# Patient Record
Sex: FEMALE | Race: WHITE | NOT HISPANIC OR LATINO | Employment: UNEMPLOYED | URBAN - METROPOLITAN AREA
[De-identification: names, ages, dates, MRNs, and addresses within clinical notes are randomized per-mention and may not be internally consistent; named-entity substitution may affect disease eponyms.]

---

## 2023-01-24 PROBLEM — J32.9 BACTERIAL SINUSITIS: Status: ACTIVE | Noted: 2023-01-24

## 2023-01-24 PROBLEM — R40.0 DAYTIME SOMNOLENCE: Status: ACTIVE | Noted: 2023-01-24

## 2023-01-24 PROBLEM — M48.061 LUMBAR SPINAL STENOSIS: Status: ACTIVE | Noted: 2023-01-24

## 2023-01-24 PROBLEM — R19.5 POSITIVE COLORECTAL CANCER SCREENING USING COLOGUARD TEST: Status: ACTIVE | Noted: 2023-01-24

## 2023-01-24 PROBLEM — M54.16 LUMBAR RADICULOPATHY: Status: ACTIVE | Noted: 2023-01-24

## 2023-01-24 PROBLEM — R35.1 NOCTURIA: Status: ACTIVE | Noted: 2023-01-24

## 2023-01-24 PROBLEM — R09.89 BRUIT: Status: ACTIVE | Noted: 2023-01-24

## 2023-01-24 PROBLEM — E78.5 HYPERLIPIDEMIA: Status: ACTIVE | Noted: 2023-01-24

## 2023-01-24 PROBLEM — M25.551 BILATERAL HIP PAIN: Status: ACTIVE | Noted: 2023-01-24

## 2023-01-24 PROBLEM — J98.8 RESPIRATORY TRACT INFECTION: Status: ACTIVE | Noted: 2023-01-24

## 2023-01-24 PROBLEM — J01.90 SINUSITIS, ACUTE: Status: ACTIVE | Noted: 2023-01-24

## 2023-01-24 PROBLEM — J44.9 COPD (CHRONIC OBSTRUCTIVE PULMONARY DISEASE) (MULTI): Status: ACTIVE | Noted: 2023-01-24

## 2023-01-24 PROBLEM — M54.30 SCIATICA: Status: ACTIVE | Noted: 2023-01-24

## 2023-01-24 PROBLEM — M17.12 PRIMARY OSTEOARTHRITIS OF LEFT KNEE: Status: ACTIVE | Noted: 2023-01-24

## 2023-01-24 PROBLEM — G57.00 PIRIFORMIS SYNDROME: Status: ACTIVE | Noted: 2023-01-24

## 2023-01-24 PROBLEM — M25.551 HIP PAIN, RIGHT: Status: ACTIVE | Noted: 2023-01-24

## 2023-01-24 PROBLEM — R63.2 OVEREATING: Status: ACTIVE | Noted: 2023-01-24

## 2023-01-24 PROBLEM — M85.80 OSTEOPENIA: Status: ACTIVE | Noted: 2023-01-24

## 2023-01-24 PROBLEM — B96.89 BACTERIAL SINUSITIS: Status: ACTIVE | Noted: 2023-01-24

## 2023-01-24 PROBLEM — M81.0 OSTEOPOROSIS: Status: ACTIVE | Noted: 2023-01-24

## 2023-01-24 PROBLEM — M25.552 BILATERAL HIP PAIN: Status: ACTIVE | Noted: 2023-01-24

## 2023-01-24 PROBLEM — M54.42 CHRONIC LEFT-SIDED LOW BACK PAIN WITH LEFT-SIDED SCIATICA: Status: ACTIVE | Noted: 2023-01-24

## 2023-01-24 PROBLEM — R79.89 LOW VITAMIN B12 LEVEL: Status: ACTIVE | Noted: 2023-01-24

## 2023-01-24 PROBLEM — G89.29 CHRONIC LEFT-SIDED LOW BACK PAIN WITH LEFT-SIDED SCIATICA: Status: ACTIVE | Noted: 2023-01-24

## 2023-01-24 PROBLEM — H04.129 DRY EYE SYNDROME: Status: ACTIVE | Noted: 2023-01-24

## 2023-01-24 PROBLEM — E11.9 DIABETES (MULTI): Status: ACTIVE | Noted: 2023-01-24

## 2023-01-24 PROBLEM — Z79.2 PROPHYLACTIC ANTIBIOTIC: Status: ACTIVE | Noted: 2023-01-24

## 2023-01-24 PROBLEM — K21.9 GERD (GASTROESOPHAGEAL REFLUX DISEASE): Status: ACTIVE | Noted: 2023-01-24

## 2023-01-24 PROBLEM — M54.31 NEURALGIA OF RIGHT SCIATIC NERVE: Status: ACTIVE | Noted: 2023-01-24

## 2023-01-24 PROBLEM — L21.9 SEBORRHEIC DERMATITIS: Status: ACTIVE | Noted: 2023-01-24

## 2023-01-24 PROBLEM — R05.3 PERSISTENT COUGH: Status: ACTIVE | Noted: 2023-01-24

## 2023-01-24 PROBLEM — B02.29 POST HERPETIC NEURALGIA: Status: ACTIVE | Noted: 2023-01-24

## 2023-01-24 PROBLEM — G62.9 NEUROPATHY: Status: ACTIVE | Noted: 2023-01-24

## 2023-01-24 PROBLEM — L85.3 DRY SKIN DERMATITIS: Status: ACTIVE | Noted: 2023-01-24

## 2023-01-24 PROBLEM — M79.18 BUTTOCK PAIN: Status: ACTIVE | Noted: 2023-01-24

## 2023-01-24 PROBLEM — F41.9 ANXIETY: Status: ACTIVE | Noted: 2023-01-24

## 2023-01-24 PROBLEM — I10 HYPERTENSION: Status: ACTIVE | Noted: 2023-01-24

## 2023-01-24 RX ORDER — LORATADINE 10 MG/1
1 TABLET ORAL DAILY PRN
COMMUNITY
Start: 2013-12-26

## 2023-01-24 RX ORDER — ALBUTEROL SULFATE 90 UG/1
2 AEROSOL, METERED RESPIRATORY (INHALATION) EVERY 4 HOURS PRN
COMMUNITY
Start: 2022-05-14

## 2023-01-24 RX ORDER — GABAPENTIN 100 MG/1
100 CAPSULE ORAL
COMMUNITY
End: 2023-10-31 | Stop reason: ALTCHOICE

## 2023-01-24 RX ORDER — PANTOPRAZOLE SODIUM 40 MG/1
40 TABLET, DELAYED RELEASE ORAL DAILY PRN
COMMUNITY
Start: 2021-07-26 | End: 2023-10-31 | Stop reason: ALTCHOICE

## 2023-01-24 RX ORDER — CLINDAMYCIN HYDROCHLORIDE 150 MG/1
4 CAPSULE ORAL
COMMUNITY
Start: 2020-08-11 | End: 2024-04-30 | Stop reason: WASHOUT

## 2023-01-24 RX ORDER — ALPRAZOLAM 0.5 MG/1
1 TABLET ORAL DAILY PRN
COMMUNITY
Start: 2021-07-26 | End: 2023-06-08 | Stop reason: ALTCHOICE

## 2023-01-24 RX ORDER — EZETIMIBE 10 MG/1
1 TABLET ORAL
COMMUNITY
Start: 2015-10-30

## 2023-01-24 RX ORDER — FLUTICASONE PROPIONATE 50 MCG
1 SPRAY, SUSPENSION (ML) NASAL 2 TIMES DAILY PRN
COMMUNITY
Start: 2016-10-24

## 2023-01-24 RX ORDER — INSULIN PUMP SYRINGE, 3 ML
EACH MISCELLANEOUS
COMMUNITY

## 2023-01-24 RX ORDER — AMLODIPINE BESYLATE 5 MG/1
1 TABLET ORAL DAILY
COMMUNITY
Start: 2013-02-25 | End: 2023-04-19 | Stop reason: SDUPTHER

## 2023-01-24 RX ORDER — ATENOLOL 50 MG/1
1 TABLET ORAL
COMMUNITY
Start: 2014-10-27 | End: 2023-06-29 | Stop reason: SDUPTHER

## 2023-01-24 RX ORDER — ASPIRIN 81 MG/1
1 TABLET ORAL
COMMUNITY
Start: 2013-12-26

## 2023-01-24 RX ORDER — ACETAMINOPHEN 500 MG
1 TABLET ORAL
COMMUNITY

## 2023-01-24 RX ORDER — BLOOD SUGAR DIAGNOSTIC
STRIP MISCELLANEOUS
COMMUNITY
Start: 2019-02-22

## 2023-01-24 RX ORDER — BUDESONIDE AND FORMOTEROL FUMARATE DIHYDRATE 160; 4.5 UG/1; UG/1
2 AEROSOL RESPIRATORY (INHALATION) 2 TIMES DAILY PRN
COMMUNITY
Start: 2019-01-24

## 2023-01-24 RX ORDER — METFORMIN HYDROCHLORIDE 500 MG/1
1 TABLET ORAL 2 TIMES DAILY
COMMUNITY
Start: 2021-04-27 | End: 2023-04-11 | Stop reason: SDUPTHER

## 2023-01-24 RX ORDER — LANCETS
EACH MISCELLANEOUS
COMMUNITY

## 2023-01-24 RX ORDER — TIZANIDINE 2 MG/1
1 TABLET ORAL 2 TIMES DAILY PRN
COMMUNITY
Start: 2022-09-07 | End: 2023-03-08 | Stop reason: SDUPTHER

## 2023-03-08 ENCOUNTER — LAB (OUTPATIENT)
Dept: LAB | Facility: LAB | Age: 83
End: 2023-03-08
Payer: MEDICARE

## 2023-03-08 ENCOUNTER — OFFICE VISIT (OUTPATIENT)
Dept: PRIMARY CARE | Facility: CLINIC | Age: 83
End: 2023-03-08
Payer: MEDICARE

## 2023-03-08 VITALS
HEIGHT: 66 IN | WEIGHT: 204 LBS | SYSTOLIC BLOOD PRESSURE: 118 MMHG | BODY MASS INDEX: 32.78 KG/M2 | DIASTOLIC BLOOD PRESSURE: 84 MMHG | OXYGEN SATURATION: 98 % | HEART RATE: 74 BPM

## 2023-03-08 DIAGNOSIS — R60.0 LOCALIZED EDEMA: ICD-10-CM

## 2023-03-08 DIAGNOSIS — M54.16 LUMBAR RADICULOPATHY: ICD-10-CM

## 2023-03-08 DIAGNOSIS — G57.00 PIRIFORMIS SYNDROME, UNSPECIFIED LATERALITY: ICD-10-CM

## 2023-03-08 DIAGNOSIS — M62.830 MUSCLE SPASM OF BACK: ICD-10-CM

## 2023-03-08 DIAGNOSIS — M79.89 LEG SWELLING: ICD-10-CM

## 2023-03-08 DIAGNOSIS — M79.89 LEG SWELLING: Primary | ICD-10-CM

## 2023-03-08 LAB
ALANINE AMINOTRANSFERASE (SGPT) (U/L) IN SER/PLAS: 13 U/L (ref 7–45)
ALBUMIN (G/DL) IN SER/PLAS: 4.5 G/DL (ref 3.4–5)
ALKALINE PHOSPHATASE (U/L) IN SER/PLAS: 60 U/L (ref 33–136)
ANION GAP IN SER/PLAS: 12 MMOL/L (ref 10–20)
ASPARTATE AMINOTRANSFERASE (SGOT) (U/L) IN SER/PLAS: 13 U/L (ref 9–39)
BILIRUBIN TOTAL (MG/DL) IN SER/PLAS: 0.4 MG/DL (ref 0–1.2)
CALCIUM (MG/DL) IN SER/PLAS: 10 MG/DL (ref 8.6–10.6)
CARBON DIOXIDE, TOTAL (MMOL/L) IN SER/PLAS: 30 MMOL/L (ref 21–32)
CHLORIDE (MMOL/L) IN SER/PLAS: 107 MMOL/L (ref 98–107)
CREATININE (MG/DL) IN SER/PLAS: 0.84 MG/DL (ref 0.5–1.05)
GFR FEMALE: 68 ML/MIN/1.73M2
GLUCOSE (MG/DL) IN SER/PLAS: 102 MG/DL (ref 74–99)
POTASSIUM (MMOL/L) IN SER/PLAS: 4.1 MMOL/L (ref 3.5–5.3)
PROTEIN TOTAL: 6.3 G/DL (ref 6.4–8.2)
SODIUM (MMOL/L) IN SER/PLAS: 145 MMOL/L (ref 136–145)
UREA NITROGEN (MG/DL) IN SER/PLAS: 15 MG/DL (ref 6–23)

## 2023-03-08 PROCEDURE — 3079F DIAST BP 80-89 MM HG: CPT | Performed by: INTERNAL MEDICINE

## 2023-03-08 PROCEDURE — 1036F TOBACCO NON-USER: CPT | Performed by: INTERNAL MEDICINE

## 2023-03-08 PROCEDURE — 1159F MED LIST DOCD IN RCRD: CPT | Performed by: INTERNAL MEDICINE

## 2023-03-08 PROCEDURE — 36415 COLL VENOUS BLD VENIPUNCTURE: CPT

## 2023-03-08 PROCEDURE — 99214 OFFICE O/P EST MOD 30 MIN: CPT | Performed by: INTERNAL MEDICINE

## 2023-03-08 PROCEDURE — 80053 COMPREHEN METABOLIC PANEL: CPT

## 2023-03-08 PROCEDURE — 3074F SYST BP LT 130 MM HG: CPT | Performed by: INTERNAL MEDICINE

## 2023-03-08 RX ORDER — TIZANIDINE HYDROCHLORIDE 2 MG/1
2 CAPSULE, GELATIN COATED ORAL 3 TIMES DAILY
Qty: 90 CAPSULE | Refills: 2 | Status: SHIPPED | OUTPATIENT
Start: 2023-03-08 | End: 2023-06-08 | Stop reason: SDUPTHER

## 2023-03-08 NOTE — PROGRESS NOTES
Subjective   Patient ID: Mai Gonzalez is a 82 y.o. female who presents for Follow-up.  HPI  ,Subjective   Patient ID: Mai Gonzalez is a 82 y.o. female who presents for Follow-up.    Patient has chronic back pain lumbar lower radiates to buttocks bilateral for many months 3 to 4 months persistent grade 6 out of 10 states it is made worse after going to pain management for a epidural injection January 2023 she is very upset about this visit she states they did an injection she started convulsing in her body after they did not come to explain what happened to her the pain management doctor she called them 3 times over the last 2 weeks no answer she is very upset about the experience with the pain management doctor the injection did not help he is getting a bit better now that she is going to chair yoga using other conservative measures  Since the injection she has noticed progressive leg swelling bilateral mostly around the ankles right greater than left  Denies any saddle paresthesias bowel urinary incontinence focal leg weakness saddle paresthesias paralysis              Health Maintenance:      Colonoscopy: Age 82 no longer indicated      Mammogram: Age 82 no longer indicated      Pelvic/Pap:      Low dose chest CT:      Aorta duplex:      Optho:      Podiatry:        Vaccines:      Prevnar 20:      Prevnar 13:      Pneumovax 23:      Tdap:      Shingrix:      COVID:      Influenza:        ROS:      General: denies fever/chills/weight loss      Head: denies HA/trauma/masses/dizziness      Eyes: Dry eyes since the back injection denies vision change/loss of vision/blurry vision/diplopia/eye pain      Ears: denies hearing loss/tinnitus/otalgia/otorrhea      Nose: denies nasal drainage/anosmia      Throat: denies dysphagia/odynophagia      Lymphatics: denies lymph node swelling      Cardiac: denies CP/palpitations/orthopnea/PND      Pulmonary: denies dyspnea/cough/wheezing      GI: denies abd  "pain/n/v/diarrhea/melena/hematochezia/hematemesis      : denies dysuria/hematuria/change frequency      Genital: denies genital discharge/lesions      Skin: Dry skin lower extremities denies rashes/lesions/masses      MSK: Chronic low back and buttock pain spasms she states he got better actually in the past with Zanaflex got worse after the epidural attempt injection ; leg swelling since having the injection for the last month and a half denies weakness/swelling/edema/gait imbalance/pain      Neuro: denies paresthesias/seizures/dysarthria      Psych: denies depression/anxiety/suicidal or homicidal ideations            Objective   /84   Pulse 74   Ht 1.676 m (5' 6\")   Wt 92.5 kg (204 lb)   SpO2 98%   BMI 32.93 kg/m²      Physical Exam:     General: AO3, NAD     Head: atraumatic/NC     Eyes: EOMI/PERRLA. Negative APD     Ears: TM pearly gray, EAC clear. No lesions or erythema     Nose: symmetric nares, no discharge     Throat: trachea midline, uvula midline pink mucosa. No thyromegaly     Lymphatics: no cervical/supraclavicular/ant or posterior cervical adenopathy/axillary/inguinal adenopathy     Breast: not examined     Chest: no deformity or tenderness to palpation     Pulm: CTA b/l, no wheeze/rhonchi/rales. nonlabored     Cardiac: RRR +s1s2, no m/r/g.      GI: soft, NT/ND. Normoactive Bsx4. No rebound/guarding.     Rectal: no examined     MSK: 5/5 strength UE LE. No edema/clubbing/cyanosis     Skin: no rashes/lesions     Vascular: 2+ palp DP PT radials b/l. Negative carotid bruit     Neuro: CNII-XII intact. No focal deficits. Reflexes 2/4 brachioradialis bicep tricep patellar achilles. Finger to nose intact.     Psych: appropriate mood/affect                    No results found for: BMPR1A, CBCDIF      Assessment/Plan        Routine screening lab work due May 2023 CBC CMP A1c lipid T4 TSH A1c    Thank you for making appointment today Mai    Please follow-up in 3 months    Jet Valdez DO      " "        Health Maintenance:      Colonoscopy:      Mammogram:      Pelvic/Pap:      Low dose chest CT:      Aorta duplex:      Optho:      Podiatry:        Vaccines:      Prevnar 20:      Prevnar 13:      Pneumovax 23:      Tdap:      Shingrix:      COVID:      Influenza:        ROS:      General: denies fever/chills/weight loss      Head: denies HA/trauma/masses/dizziness      Eyes: denies vision change/loss of vision/blurry vision/diplopia/eye pain      Ears: denies hearing loss/tinnitus/otalgia/otorrhea      Nose: denies nasal drainage/anosmia      Throat: denies dysphagia/odynophagia      Lymphatics: denies lymph node swelling      Cardiac: denies CP/palpitations/orthopnea/PND      Pulmonary: denies dyspnea/cough/wheezing      GI: denies abd pain/n/v/diarrhea/melena/hematochezia/hematemesis      : denies dysuria/hematuria/change frequency      Genital: denies genital discharge/lesions      Skin: denies rashes/lesions/masses      MSK: denies weakness/swelling/edema/gait imbalance/pain      Neuro: denies paresthesias/seizures/dysarthria      Psych: denies depression/anxiety/suicidal or homicidal ideations            Objective   /84   Pulse 74   Ht 1.676 m (5' 6\")   Wt 92.5 kg (204 lb)   SpO2 98%   BMI 32.93 kg/m²      Physical Exam:     General: AO3, NAD     Head: atraumatic/NC     Eyes: EOMI/PERRLA. Negative APD     Ears: TM pearly gray, EAC clear. No lesions or erythema     Nose: symmetric nares, no discharge     Throat: trachea midline, uvula midline pink mucosa. No thyromegaly     Lymphatics: no cervical/supraclavicular/ant or posterior cervical adenopathy/axillary/inguinal adenopathy     Breast: not examined     Chest: no deformity or tenderness to palpation     Pulm: CTA b/l, no wheeze/rhonchi/rales. nonlabored     Cardiac: RRR +s1s2, no m/r/g.      GI: soft, NT/ND. Normoactive Bsx4. No rebound/guarding.     Rectal: no examined     MSK: 5/5 strength UE LE. No edema/clubbing/cyanosis     Skin: no " rashes/lesions     Vascular: 2+ palp DP PT radials b/l. Negative carotid bruit     Neuro: CNII-XII intact. No focal deficits. Reflexes 2/4 brachioradialis bicep tricep patellar achilles. Finger to nose intact.     Psych: appropriate mood/affect                    No results found for: BMPR1A, CBCDIF      Assessment/Plan            Jet Valdez, DO

## 2023-03-13 ENCOUNTER — TELEPHONE (OUTPATIENT)
Dept: PRIMARY CARE | Facility: CLINIC | Age: 83
End: 2023-03-13
Payer: MEDICARE

## 2023-03-22 DIAGNOSIS — R19.09 RIGHT GROIN MASS: ICD-10-CM

## 2023-03-22 DIAGNOSIS — I50.30 DIASTOLIC CONGESTIVE HEART FAILURE, UNSPECIFIED HF CHRONICITY (MULTI): Primary | ICD-10-CM

## 2023-03-22 RX ORDER — FUROSEMIDE 20 MG/1
20 TABLET ORAL DAILY PRN
Qty: 30 TABLET | Refills: 2 | Status: SHIPPED | OUTPATIENT
Start: 2023-03-22 | End: 2023-06-08 | Stop reason: ALTCHOICE

## 2023-03-22 NOTE — TELEPHONE ENCOUNTER
Result Communication  Called patient with results no answer left voicemail advised echo shows good pumping fraction of the heart but she has diastolic CHF likely causing some of the leg swelling follow-up with cardiology as ordered and take Lasix daily as needed as ordered; ultrasound legs no sign of blood clot but there is a small lesion in the right groin area which could be a lymph node versus other should follow-up with general surgery for review no acute issues.  Advised if any further questions or would like an in office result review please schedule follow-up in the next 2 to 4 weeks thank you      10:38 AM  Called patient with results no answer left voicemail advised echo shows good pumping fraction of the heart but she has diastolic CHF likely causing some of the leg swelling follow-up with cardiology as ordered and take Lasix daily as needed as ordered; ultrasound legs no sign of blood clot but there is a small lesion in the right groin area which could be a lymph node versus other should follow-up with general surgery for review no acute issues.  Advised if any further questions or would like an in office result review please schedule follow-up in the next 2 to 4 weeks thank you

## 2023-03-29 ENCOUNTER — TELEPHONE (OUTPATIENT)
Dept: PRIMARY CARE | Facility: CLINIC | Age: 83
End: 2023-03-29
Payer: MEDICARE

## 2023-03-29 NOTE — TELEPHONE ENCOUNTER
Result Communication        5:18 PM    Called patient no answer left voicemail advised ultrasound of the lower extremity is negative for blood clots no issues.  Advised if any further questions or would like an in office result review please schedule follow-up the next 2 to 4 weeks thank you

## 2023-04-11 DIAGNOSIS — E11.9 TYPE 2 DIABETES MELLITUS WITHOUT COMPLICATION, WITHOUT LONG-TERM CURRENT USE OF INSULIN (MULTI): Primary | ICD-10-CM

## 2023-04-11 RX ORDER — METFORMIN HYDROCHLORIDE 500 MG/1
500 TABLET ORAL 2 TIMES DAILY
Qty: 60 TABLET | Refills: 1 | Status: SHIPPED | OUTPATIENT
Start: 2023-04-11 | End: 2023-07-18 | Stop reason: SDUPTHER

## 2023-04-19 DIAGNOSIS — I10 HYPERTENSION, UNSPECIFIED TYPE: Primary | ICD-10-CM

## 2023-04-19 RX ORDER — AMLODIPINE BESYLATE 5 MG/1
5 TABLET ORAL DAILY
Qty: 90 TABLET | Refills: 1 | Status: SHIPPED | OUTPATIENT
Start: 2023-04-19 | End: 2023-10-31 | Stop reason: SDUPTHER

## 2023-06-08 ENCOUNTER — OFFICE VISIT (OUTPATIENT)
Dept: PRIMARY CARE | Facility: CLINIC | Age: 83
End: 2023-06-08
Payer: MEDICARE

## 2023-06-08 VITALS
HEART RATE: 66 BPM | DIASTOLIC BLOOD PRESSURE: 74 MMHG | OXYGEN SATURATION: 97 % | WEIGHT: 206 LBS | SYSTOLIC BLOOD PRESSURE: 128 MMHG | BODY MASS INDEX: 33.11 KG/M2 | HEIGHT: 66 IN

## 2023-06-08 DIAGNOSIS — Z91.89 AT RISK FOR IMPAIRED HEALTH MAINTENANCE: ICD-10-CM

## 2023-06-08 DIAGNOSIS — M54.50 LUMBAR BACK PAIN: ICD-10-CM

## 2023-06-08 DIAGNOSIS — Z00.00 HEALTHCARE MAINTENANCE: Primary | ICD-10-CM

## 2023-06-08 DIAGNOSIS — M62.830 MUSCLE SPASM OF BACK: ICD-10-CM

## 2023-06-08 PROCEDURE — 1170F FXNL STATUS ASSESSED: CPT | Performed by: INTERNAL MEDICINE

## 2023-06-08 PROCEDURE — 1157F ADVNC CARE PLAN IN RCRD: CPT | Performed by: INTERNAL MEDICINE

## 2023-06-08 PROCEDURE — 1036F TOBACCO NON-USER: CPT | Performed by: INTERNAL MEDICINE

## 2023-06-08 PROCEDURE — 1159F MED LIST DOCD IN RCRD: CPT | Performed by: INTERNAL MEDICINE

## 2023-06-08 PROCEDURE — G0439 PPPS, SUBSEQ VISIT: HCPCS | Performed by: INTERNAL MEDICINE

## 2023-06-08 PROCEDURE — 99214 OFFICE O/P EST MOD 30 MIN: CPT | Performed by: INTERNAL MEDICINE

## 2023-06-08 PROCEDURE — 3074F SYST BP LT 130 MM HG: CPT | Performed by: INTERNAL MEDICINE

## 2023-06-08 PROCEDURE — 3078F DIAST BP <80 MM HG: CPT | Performed by: INTERNAL MEDICINE

## 2023-06-08 RX ORDER — TIZANIDINE HYDROCHLORIDE 2 MG/1
2 CAPSULE, GELATIN COATED ORAL 3 TIMES DAILY
Qty: 90 CAPSULE | Refills: 2 | Status: SHIPPED | OUTPATIENT
Start: 2023-06-08 | End: 2023-10-31 | Stop reason: ALTCHOICE

## 2023-06-08 ASSESSMENT — PATIENT HEALTH QUESTIONNAIRE - PHQ9
1. LITTLE INTEREST OR PLEASURE IN DOING THINGS: NOT AT ALL
SUM OF ALL RESPONSES TO PHQ9 QUESTIONS 1 AND 2: 0
2. FEELING DOWN, DEPRESSED OR HOPELESS: NOT AT ALL

## 2023-06-08 ASSESSMENT — ACTIVITIES OF DAILY LIVING (ADL)
GROCERY_SHOPPING: INDEPENDENT
TAKING_MEDICATION: INDEPENDENT
DOING_HOUSEWORK: INDEPENDENT
DRESSING: INDEPENDENT
MANAGING_FINANCES: INDEPENDENT
BATHING: INDEPENDENT

## 2023-06-08 ASSESSMENT — ENCOUNTER SYMPTOMS
LOSS OF SENSATION IN FEET: 0
OCCASIONAL FEELINGS OF UNSTEADINESS: 0
DEPRESSION: 0

## 2023-06-08 NOTE — PROGRESS NOTES
Chief Complaint: Medicare Wellness Exam/Comprehensive Problem Focused Follow Up    HPI:  Patient presents with some back pain lower lumbar seems to radiate across her low back piriformis she states months grade 5 worse when she sleeps at night she believes tizanidine helped in the past she is trialing Tylenol and Aleve may be helps a little bit  Denies saddle paresthesias bowel urinary incontinence focal weakness    Active Problem List      Comprehensive Medical/Surgical/Social/Family History  Past Medical History:   Diagnosis Date    Adverse effect of unspecified systemic antibiotic, initial encounter 02/28/2017    Antibiotics causing adverse effect in therapeutic use, initial encounter    Aftercare following joint replacement surgery 05/16/2018    Aftercare following right hip joint replacement surgery    Body mass index (BMI) 33.0-33.9, adult 02/23/2022    BMI 33.0-33.9,adult    Body mass index (BMI) 33.0-33.9, adult     BMI 33.0-33.9,adult    Candidiasis of skin and nail 10/25/2019    Candidal intertrigo    Encounter for other screening for malignant neoplasm of breast     Screening for breast cancer    Fracture of unspecified carpal bone, right wrist, initial encounter for closed fracture 08/14/2015    Wrist fracture, right    Malignant neoplasm of upper-outer quadrant of left female breast (CMS/HCC) 11/08/2016    Breast cancer of upper-outer quadrant of left female breast    Other conditions influencing health status 12/02/2016    History of cough    Personal history of diseases of the skin and subcutaneous tissue 02/28/2017    History of urticaria    Personal history of other diseases of the respiratory system 02/12/2017    History of acute sinusitis    Personal history of other diseases of the respiratory system 12/02/2016    History of acute bronchitis    Personal history of other specified conditions 08/13/2018    History of fatigue    Personal history of other specified conditions 07/16/2019    History of  dysuria     Past Surgical History:   Procedure Laterality Date    ADENOIDECTOMY  02/28/2017    Adenoidectomy    HIP SURGERY  08/14/2015    Hip Surgery Right    KNEE SURGERY  01/24/2019    Knee Surgery Left    OTHER SURGICAL HISTORY  08/14/2015    History Of Prior Surgery    OTHER SURGICAL HISTORY  08/14/2015    Dental Surgery    OTHER SURGICAL HISTORY  08/14/2015    Hip Surgery Left    OTHER SURGICAL HISTORY  08/14/2015    Treatment Of The Right Leg    OTHER SURGICAL HISTORY  01/24/2019    Cosmetic surgery    OTHER SURGICAL HISTORY  08/14/2015    Left Breast Partial Mastectomy    SENTINEL LYMPH NODE BIOPSY  08/14/2015    [facility]anks Lymph Node Biopsy    TONSILLECTOMY  02/28/2017    Tonsillectomy     Social History     Social History Narrative    Not on file         Allergies and Medications  Ace inhibitors, Amoxicillin, Atorvastatin, Colesevelam, Red yeast rice, Rosuvastatin, and Venlafaxine  Current Outpatient Medications on File Prior to Visit   Medication Sig Dispense Refill    albuterol 90 mcg/actuation inhaler Inhale 2 puffs every 4 hours if needed.      amLODIPine (Norvasc) 5 mg tablet Take 1 tablet (5 mg) by mouth once daily. For Hypertension 90 tablet 1    ascorbic acid (VITAMIN C ORAL) Take 1,000 mg by mouth once every day.      aspirin 81 mg EC tablet Take 1 tablet (81 mg) by mouth once every day.      atenolol (Tenormin) 50 mg tablet Take 1 tablet (50 mg) by mouth once every day.      blood glucose control, normal solution Use  as directed. Testing every day, Non-Insulin      blood sugar diagnostic (Contour Next Test Strips) strip Testing 1 x daily Non Insulin Dependent Diabetes 2      budesonide-formoteroL (Symbicort) 160-4.5 mcg/actuation inhaler Inhale 2 puffs 2 times a day as needed. Rinse mouth after use      calcium carbonate (CORAL CALCIUM ORAL) Take 2 tablets by mouth once every day.      cholecalciferol (Vitamin D-3) 50 mcg (2,000 unit) capsule Take 1 capsule (50 mcg) by mouth once every day.       clindamycin (Cleocin) 150 mg capsule Take 4 capsules (600 mg) by mouth. Take one hour before the procedure   For: Antibiotic prophylaxis for dental procedure indicated due to prior joint replacement.      ezetimibe (Zetia) 10 mg tablet Take 1 tablet (10 mg) by mouth once every day.      fluticasone (Flonase) 50 mcg/actuation nasal spray Administer 1 spray into each nostril 2 times a day as needed. For: Health Maintenance      lancets misc Testing once daily      loratadine (Claritin) 10 mg tablet Take 1 tablet (10 mg) by mouth once daily as needed.      omega-3/dha/epa/fish oil (OMEGA-3 ORAL) 300 mg. Take 2 capsules (600 mg) once every day.      pantoprazole (ProtoNix) 40 mg EC tablet Take 1 tablet (40 mg) by mouth once daily as needed. For: GERD 9 gastroesophageal reflux disease)      [DISCONTINUED] tiZANidine (Zanaflex) 2 mg capsule Take 1 capsule (2 mg) by mouth in the morning and 1 capsule (2 mg) in the evening and 1 capsule (2 mg) before bedtime. 90 capsule 2    gabapentin (Neurontin) 100 mg capsule 1 capsule (100 mg). 1 capsule at bedtime x 5 days, then 2 capsules at bedtime for 5 days, then 3 caps at bedtime from then on.      metFORMIN (Glucophage) 500 mg tablet Take 1 tablet (500 mg) by mouth in the morning and 1 tablet (500 mg) before bedtime. 60 tablet 1    [DISCONTINUED] ALPRAZolam (Xanax) 0.5 mg tablet Take 1 tablet (0.5 mg) by mouth once daily as needed for anxiety.      [DISCONTINUED] furosemide (Lasix) 20 mg tablet Take 1 tablet (20 mg) by mouth once daily as needed (leg edema). 30 tablet 2     No current facility-administered medications on file prior to visit.       Medications and Supplements  prescribed by me and other practitioners or clinical pharmacist (such as prescriptions, OTC's, herbal therapies and supplements) were reviewed and documented in the medical record.    Tobacco/Alcohol/Opioid use, as well as Illicit Drug Use was screened for/reviewed and documented in Social History section  "and medication list as appropriate  Activities of Daily Living  In your present state of health, do you have any difficulty performing the following activities?:   Preparing food and eating?: No  Bathing yourself: No  Getting dressed: No  Using the toilet:No  Moving around from place to place: No  In the past year have you fallen or had a near fall?:No    Depression Screen  (Note: if answer to either of the following is \"Yes\", then a more complete depression screening is indicated)   Q1: Over the past two weeks, have you felt down, depressed or hopeless? No  Q2: Over the past two weeks, have you felt little interest or pleasure in doing things? No    Current exercise habits: Gym/health club routine includes swimming and chair exercises.   Dietary issues discussed: Yes  Hearing difficulties: Yes  Safe in current home environment: yes  Visual Acuity assessed: no  Cognitive Impairment assessed: no       Advance directives  Advanced Care Planning (including a Living Will, Healthcare POA, as well as specific end of life choices and/or directives), was discussed for approximately 16 minutes with the patient and/or surrogate, voluntarily, and documented in the medical record.     Cardiac Risk Assessment  Cardiovascular risk was discussed and, if needed, lifestyle modifications recommended, including nutritional choices, exercise, and elimination of habits contributing to risk. We agreed on a plan to reduce the current cardiovascular risk based on above discussion as needed.  Aspirin use/disuse was discussed after reviewing the updated guidelines below:    Consider low dose Aspirin ( mg) use if the benefit for cardiovascular disease prevention outweighs risk for bleeding complications.   In general, low dose ASA should be considered:  In patients WITHOUT prior MI/stroke/PAD (primary prevention):   a. Age <60: Use if 10-year cardiovascular disease risk >20%, with discussion of risks and benefits with patient  b. Age " 60-<70: Use if 10-year cardiovascular disease risk >20% and low bleeding (e.g., gastrointenstinal) risk, with discussion of risks and benefits with patient  c. Age >=70: Do not use    In patients WITH prior MI/stroke/PAD (secondary prevention):   Generally use unless extremely high bleeding (e.g., gastrointenstinal) risk, with discussion of risks and benefits with patient    ROS otherwise negative aside from what was mentioned above in HPI.  Health Maintenance  Colonoscopy: []  Mammogram: []  Pap smear/Pelvic Exam: []  DEXA: []  Low dose chest CT: []   Screening Abdominal US: []  Opthalmology: []  Podiatry: []    Immunizations  Influenza: []  Pneumovax: []  Prevnar 13: []  Td/Tdap: []  Zostavax: []            ROS:  Constitutional: [] denies fever/night sweats/weight loss/fatigue/insomnia  Head: [] denies trauma/headache/masses  Eyes: [] denies loss of vision/blurry vision/diplopia/redness/eye pain  Ears: [] denies hearing loss/tinnitus/masses/pain/otorrhea  Nose: [Intermittent allergies rhinitis cleared for years has not been using Flonase daily] denies anosmia/masses/epistaxis/drainage  Throat: [] denies dysphagia/odynophagia  Neck: [] denies masses/asymmetry  Lymphatics: [] denies lymph node swelling  Cardiovascular: [] denies CP/orthopnea/PND/leg edema/palpitations  Pulmonary: [] denies SOB/dyspnea with exertion/cough/sputum production/hemoptysis/wheezing  GI: [] denies abdominal pain/nausea/vomiting/dysphagia/odynophagia/melena/hematochezia/diarrhea/constipation/change in stool caliber/bowel incontinence  : [] denies dysuria/hematuria/increased frequency/nocturia/dribbling/urinary incontinence  Genital: [] denies discharge/masses/lesions  Musculoskeletal: [Low back pain muscle spasm] denies trauma/arthralgia/myalgia/deformity/joint swelling  Hematologic: [] denies easy bruising or bleeding  Neurologic: [] denies gait imbalance/paresthesias/saddle paresthesia/focal weakness/dysarthria/seizure  Skin: [] denies  masses/lesions/rashes/tattoos  Psychiatric: [] denies depression/suicidal or homicidal thoughts    Vitals  Vitals:    06/08/23 1125   BP: 128/74   Pulse: 66   SpO2: 97%     Body mass index is 33.25 kg/m².  Physical Exam  Gen: Alert, NAD  HEENT:  PERRLA, EOMI, conjunctiva and sclera normal in appearance. External auditory canals/TMs normal; Oral cavity and posterior pharynx without lesions/exudate  Neck:  Supple with FROM; No masses/nodes palpable; Thyroid nontender and without nodules; No BAYRON  Respiratory:  Lungs CTAB  Cardiovascular:  Heart RRR. No M/R/G. Peripheral pulses equal bilaterally  Abdomen:  Soft, nontender, BS present throughout; No R/G/R; No HSM or masses palpated  Extremities:  FROM all extremities; Muscle strength grossly normal with good tone  Neuro:  CN II-XII intact; Reflexes 2+/2+; Gross motor and sensory intact  Skin:  No suspicious lesions present  Breast: No masses, skin lesions or nipple discharges, no axillary lymphadenopathy  Patient deferred orthopedic surgery referral states she would not want surgery anyway  Deferred PT  Assessment and Plan:  Problem List Items Addressed This Visit    None  Visit Diagnoses       Healthcare maintenance    -  Primary    Relevant Orders    CBC and Auto Differential    Basic Metabolic Panel    Hemoglobin A1C    T4, free    TSH    Lipid panel    Muscle spasm of back        Relevant Medications    tiZANidine (Zanaflex) 2 mg capsule    At risk for impaired health maintenance        Relevant Orders    CBC and Auto Differential    Lumbar back pain        DDD, severe L stenosis l5-l5          Recommend using the Flonase daily as ordered for the allergy symptoms    Thank you for making a point today Mai    Please follow-up in 3 months      During the course of the visit the patient was educated and counseled about age appropriate screening and preventive services. Completed preventive screenings were documented in the chart and orders were placed for  outstanding screenings/procedures as documented in the Assessment and Plan.      Patient Instructions (the written plan) was given to the patient at check out.      Jet Valdez, DO

## 2023-06-14 ENCOUNTER — LAB (OUTPATIENT)
Dept: LAB | Facility: LAB | Age: 83
End: 2023-06-14
Payer: MEDICARE

## 2023-06-14 DIAGNOSIS — Z00.00 HEALTHCARE MAINTENANCE: ICD-10-CM

## 2023-06-14 DIAGNOSIS — Z91.89 AT RISK FOR IMPAIRED HEALTH MAINTENANCE: ICD-10-CM

## 2023-06-14 LAB
ANION GAP IN SER/PLAS: 14 MMOL/L (ref 10–20)
BASOPHILS (10*3/UL) IN BLOOD BY AUTOMATED COUNT: 0.02 X10E9/L (ref 0–0.1)
BASOPHILS/100 LEUKOCYTES IN BLOOD BY AUTOMATED COUNT: 0.2 % (ref 0–2)
CALCIUM (MG/DL) IN SER/PLAS: 9.6 MG/DL (ref 8.6–10.6)
CARBON DIOXIDE, TOTAL (MMOL/L) IN SER/PLAS: 26 MMOL/L (ref 21–32)
CHLORIDE (MMOL/L) IN SER/PLAS: 108 MMOL/L (ref 98–107)
CHOLESTEROL (MG/DL) IN SER/PLAS: 214 MG/DL (ref 0–199)
CHOLESTEROL IN HDL (MG/DL) IN SER/PLAS: 40.1 MG/DL
CHOLESTEROL/HDL RATIO: 5.3
CREATININE (MG/DL) IN SER/PLAS: 0.86 MG/DL (ref 0.5–1.05)
EOSINOPHILS (10*3/UL) IN BLOOD BY AUTOMATED COUNT: 0.51 X10E9/L (ref 0–0.4)
EOSINOPHILS/100 LEUKOCYTES IN BLOOD BY AUTOMATED COUNT: 5.8 % (ref 0–6)
ERYTHROCYTE DISTRIBUTION WIDTH (RATIO) BY AUTOMATED COUNT: 13.2 % (ref 11.5–14.5)
ERYTHROCYTE MEAN CORPUSCULAR HEMOGLOBIN CONCENTRATION (G/DL) BY AUTOMATED: 31.8 G/DL (ref 32–36)
ERYTHROCYTE MEAN CORPUSCULAR VOLUME (FL) BY AUTOMATED COUNT: 93 FL (ref 80–100)
ERYTHROCYTES (10*6/UL) IN BLOOD BY AUTOMATED COUNT: 4.69 X10E12/L (ref 4–5.2)
GFR FEMALE: 66 ML/MIN/1.73M2
GLUCOSE (MG/DL) IN SER/PLAS: 123 MG/DL (ref 74–99)
HEMATOCRIT (%) IN BLOOD BY AUTOMATED COUNT: 43.4 % (ref 36–46)
HEMOGLOBIN (G/DL) IN BLOOD: 13.8 G/DL (ref 12–16)
IMMATURE GRANULOCYTES/100 LEUKOCYTES IN BLOOD BY AUTOMATED COUNT: 0.3 % (ref 0–0.9)
LDL: 131 MG/DL (ref 0–99)
LEUKOCYTES (10*3/UL) IN BLOOD BY AUTOMATED COUNT: 8.8 X10E9/L (ref 4.4–11.3)
LYMPHOCYTES (10*3/UL) IN BLOOD BY AUTOMATED COUNT: 2.69 X10E9/L (ref 0.8–3)
LYMPHOCYTES/100 LEUKOCYTES IN BLOOD BY AUTOMATED COUNT: 30.5 % (ref 13–44)
MONOCYTES (10*3/UL) IN BLOOD BY AUTOMATED COUNT: 0.58 X10E9/L (ref 0.05–0.8)
MONOCYTES/100 LEUKOCYTES IN BLOOD BY AUTOMATED COUNT: 6.6 % (ref 2–10)
NEUTROPHILS (10*3/UL) IN BLOOD BY AUTOMATED COUNT: 4.99 X10E9/L (ref 1.6–5.5)
NEUTROPHILS/100 LEUKOCYTES IN BLOOD BY AUTOMATED COUNT: 56.6 % (ref 40–80)
NON HDL CHOLESTEROL: 174 MG/DL
NRBC (PER 100 WBCS) BY AUTOMATED COUNT: 0 /100 WBC (ref 0–0)
PLATELETS (10*3/UL) IN BLOOD AUTOMATED COUNT: 227 X10E9/L (ref 150–450)
POTASSIUM (MMOL/L) IN SER/PLAS: 4 MMOL/L (ref 3.5–5.3)
SODIUM (MMOL/L) IN SER/PLAS: 144 MMOL/L (ref 136–145)
THYROTROPIN (MIU/L) IN SER/PLAS BY DETECTION LIMIT <= 0.05 MIU/L: 3.15 MIU/L (ref 0.44–3.98)
THYROXINE (T4) FREE (NG/DL) IN SER/PLAS: 0.96 NG/DL (ref 0.78–1.48)
TRIGLYCERIDE (MG/DL) IN SER/PLAS: 216 MG/DL (ref 0–149)
UREA NITROGEN (MG/DL) IN SER/PLAS: 13 MG/DL (ref 6–23)
VLDL: 43 MG/DL (ref 0–40)

## 2023-06-14 PROCEDURE — 83036 HEMOGLOBIN GLYCOSYLATED A1C: CPT

## 2023-06-14 PROCEDURE — 85025 COMPLETE CBC W/AUTO DIFF WBC: CPT

## 2023-06-14 PROCEDURE — 84439 ASSAY OF FREE THYROXINE: CPT

## 2023-06-14 PROCEDURE — 36415 COLL VENOUS BLD VENIPUNCTURE: CPT

## 2023-06-14 PROCEDURE — 80048 BASIC METABOLIC PNL TOTAL CA: CPT

## 2023-06-14 PROCEDURE — 84443 ASSAY THYROID STIM HORMONE: CPT

## 2023-06-14 PROCEDURE — 80061 LIPID PANEL: CPT

## 2023-06-15 LAB
ESTIMATED AVERAGE GLUCOSE FOR HBA1C: 146 MG/DL
HEMOGLOBIN A1C/HEMOGLOBIN TOTAL IN BLOOD: 6.7 %

## 2023-06-21 ENCOUNTER — TELEPHONE (OUTPATIENT)
Dept: PRIMARY CARE | Facility: CLINIC | Age: 83
End: 2023-06-21
Payer: MEDICARE

## 2023-06-21 DIAGNOSIS — E78.5 HYPERLIPIDEMIA, UNSPECIFIED HYPERLIPIDEMIA TYPE: Primary | ICD-10-CM

## 2023-06-21 NOTE — TELEPHONE ENCOUNTER
Result Communication    Resulted Orders   CBC and Auto Differential   Result Value Ref Range    WBC 8.8 4.4 - 11.3 x10E9/L    nRBC 0.0 0.0 - 0.0 /100 WBC    RBC 4.69 4.00 - 5.20 x10E12/L    Hemoglobin 13.8 12.0 - 16.0 g/dL    Hematocrit 43.4 36.0 - 46.0 %    MCV 93 80 - 100 fL    MCHC 31.8 (L) 32.0 - 36.0 g/dL    Platelets 227 150 - 450 x10E9/L    RDW 13.2 11.5 - 14.5 %    Neutrophils % 56.6 40.0 - 80.0 %    Immature Granulocytes %, Automated 0.3 0.0 - 0.9 %      Comment:       Immature Granulocyte Count (IG) includes promyelocytes,    myelocytes and metamyelocytes but does not include bands.   Percent differential counts (%) should be interpreted in the   context of the absolute cell counts (cells/L).    Lymphocytes % 30.5 13.0 - 44.0 %    Monocytes % 6.6 2.0 - 10.0 %    Eosinophils % 5.8 0.0 - 6.0 %    Basophils % 0.2 0.0 - 2.0 %    Neutrophils Absolute 4.99 1.60 - 5.50 x10E9/L    Lymphocytes Absolute 2.69 0.80 - 3.00 x10E9/L    Monocytes Absolute 0.58 0.05 - 0.80 x10E9/L    Eosinophils Absolute 0.51 (H) 0.00 - 0.40 x10E9/L    Basophils Absolute 0.02 0.00 - 0.10 x10E9/L   Basic Metabolic Panel   Result Value Ref Range    Glucose 123 (H) 74 - 99 mg/dL    Sodium 144 136 - 145 mmol/L    Potassium 4.0 3.5 - 5.3 mmol/L    Chloride 108 (H) 98 - 107 mmol/L    Bicarbonate 26 21 - 32 mmol/L    Anion Gap 14 10 - 20 mmol/L    Urea Nitrogen 13 6 - 23 mg/dL    Creatinine 0.86 0.50 - 1.05 mg/dL    GFR Female 66 >90 mL/min/1.73m2      Comment:       CALCULATIONS OF ESTIMATED GFR ARE PERFORMED   USING THE 2021 CKD-EPI STUDY REFIT EQUATION   WITHOUT THE RACE VARIABLE FOR THE IDMS-TRACEABLE   CREATININE METHODS.    https://jasn.asnjournals.org/content/early/2021/09/22/ASN.3155442631    Calcium 9.6 8.6 - 10.6 mg/dL   Hemoglobin A1C   Result Value Ref Range    Hemoglobin A1C 6.7 (A) %      Comment:           Diagnosis of Diabetes-Adults   Non-Diabetic: < or = 5.6%   Increased risk for developing diabetes: 5.7-6.4%   Diagnostic of  diabetes: > or = 6.5%  .       Monitoring of Diabetes                Age (y)     Therapeutic Goal (%)   Adults:          >18           <7.0   Pediatrics:    13-18           <7.5                   7-12           <8.0                   0- 6            7.5-8.5   American Diabetes Association. Diabetes Care 33(S1), Jan 2010.    Estimated Average Glucose 146 MG/DL   T4, free   Result Value Ref Range    Free T4 0.96 0.78 - 1.48 ng/dL      Comment:       Thyroxine Free testing is performed using different testing    methodology at [facility] than at other    system \[facility]\"". Direct result comparisons should    only be made within the same method.   TSH   Result Value Ref Range    TSH 3.15 0.44 - 3.98 mIU/L      Comment:       TSH testing is performed using different testing    methodology at [facility] than at other    [facility]. Direct result comparisons should    only be made within the same method.   Lipid panel   Result Value Ref Range    Cholesterol 214 (H) 0 - 199 mg/dL      Comment:      .      AGE      DESIRABLE   BORDERLINE HIGH   HIGH     0-19 Y     0 - 169       170 - 199     >/= 200    20-24 Y     0 - 189       190 - 224     >/= 225         >24 Y     0 - 199       200 - 239     >/= 240   **All ranges are based on fasting samples. Specific   therapeutic targets will vary based on patient-specific   cardiac risk.  .   Pediatric guidelines reference:Pediatrics 2011, 128(S5).   Adult guidelines reference: NCEP ATPIII Guidelines,     TUSHAR 2001, 258:2486-97  .   Venipuncture immediately after or during the    administration of Metamizole may lead to falsely   low results. Testing should be performed immediately   prior to Metamizole dosing.    HDL 40.1 mg/dL      Comment:      .      AGE      VERY LOW   LOW     NORMAL    HIGH       0-19 Y       < 35   < 40     40-45     ----    20-24 Y       ----   < 40       >45     ----      >24 Y       ----   < 40     40-60      >60  .     Cholesterol/HDL Ratio 5.3 (A)       Comment:      REF VALUES  DESIRABLE  < 3.4  HIGH RISK  > 5.0     (H) 0 - 99 mg/dL      Comment:      .                           NEAR      BORD      AGE      DESIRABLE  OPTIMAL    HIGH     HIGH     VERY HIGH     0-19 Y     0 - 109     ---    110-129   >/= 130     ----    20-24 Y     0 - 119     ---    120-159   >/= 160     ----      >24 Y     0 -  99   100-129  130-159   160-189     >/=190  .    VLDL 43 (H) 0 - 40 mg/dL    Triglycerides 216 (H) 0 - 149 mg/dL      Comment:      .      AGE      DESIRABLE   BORDERLINE HIGH   HIGH     VERY HIGH   0 D-90 D    19 - 174         ----         ----        ----  91 D- 9 Y     0 -  74        75 -  99     >/= 100      ----    10-19 Y     0 -  89        90 - 129     >/= 130      ----    20-24 Y     0 - 114       115 - 149     >/= 150      ----         >24 Y     0 - 149       150 - 199    200- 499    >/= 500  .   Venipuncture immediately after or during the    administration of Metamizole may lead to falsely   low results. Testing should be performed immediately   prior to Metamizole dosing.    Non HDL Cholesterol 174 mg/dL      Comment:          AGE      DESIRABLE   BORDERLINE HIGH   HIGH     VERY HIGH     0-19 Y     0 - 119       120 - 144     >/= 145    >/= 160    20-24 Y     0 - 149       150 - 189     >/= 190      ----         >24 Y    30 MG/DL ABOVE LDL CHOLESTEROL GOAL  .       6:47 PM      Called patient no answer left voicemail advised high cholesterol uncontrolled at 214 goal under 200 she has an intolerance to statin and is already on Zetia I recommend she sees cardiology as ordered may be a candidate for Repatha or other otherwise glucose well controlled A1c 6.7 otherwise currently available blood work is stable normal if any further questions or would like an in office result review please schedule follow-up in the next 2 to 4 weeks thank you

## 2023-06-29 DIAGNOSIS — I10 HYPERTENSION, UNSPECIFIED TYPE: Primary | ICD-10-CM

## 2023-06-29 RX ORDER — ATENOLOL 50 MG/1
50 TABLET ORAL
Qty: 90 TABLET | Refills: 1 | Status: SHIPPED | OUTPATIENT
Start: 2023-06-29 | End: 2024-01-18 | Stop reason: SDUPTHER

## 2023-07-18 DIAGNOSIS — E11.9 TYPE 2 DIABETES MELLITUS WITHOUT COMPLICATION, WITHOUT LONG-TERM CURRENT USE OF INSULIN (MULTI): ICD-10-CM

## 2023-07-18 RX ORDER — METFORMIN HYDROCHLORIDE 500 MG/1
500 TABLET ORAL 2 TIMES DAILY
Qty: 180 TABLET | Refills: 0 | Status: SHIPPED | OUTPATIENT
Start: 2023-07-18 | End: 2023-10-31 | Stop reason: SDUPTHER

## 2023-09-07 ENCOUNTER — APPOINTMENT (OUTPATIENT)
Dept: PRIMARY CARE | Facility: CLINIC | Age: 83
End: 2023-09-07
Payer: MEDICARE

## 2023-09-21 LAB
CHOLESTEROL (MG/DL) IN SER/PLAS: 122 MG/DL (ref 0–199)
CHOLESTEROL IN HDL (MG/DL) IN SER/PLAS: 43.6 MG/DL
CHOLESTEROL/HDL RATIO: 2.8
LDL: 59 MG/DL (ref 0–99)
TRIGLYCERIDE (MG/DL) IN SER/PLAS: 95 MG/DL (ref 0–149)
VLDL: 19 MG/DL (ref 0–40)

## 2023-10-31 ENCOUNTER — OFFICE VISIT (OUTPATIENT)
Dept: PRIMARY CARE | Facility: CLINIC | Age: 83
End: 2023-10-31
Payer: MEDICARE

## 2023-10-31 VITALS
DIASTOLIC BLOOD PRESSURE: 72 MMHG | SYSTOLIC BLOOD PRESSURE: 121 MMHG | TEMPERATURE: 97.2 F | HEIGHT: 66 IN | BODY MASS INDEX: 32.47 KG/M2 | OXYGEN SATURATION: 95 % | HEART RATE: 78 BPM | WEIGHT: 202 LBS

## 2023-10-31 DIAGNOSIS — E11.9 TYPE 2 DIABETES MELLITUS WITHOUT COMPLICATION, WITHOUT LONG-TERM CURRENT USE OF INSULIN (MULTI): ICD-10-CM

## 2023-10-31 DIAGNOSIS — I10 HYPERTENSION, UNSPECIFIED TYPE: ICD-10-CM

## 2023-10-31 DIAGNOSIS — M51.36 DDD (DEGENERATIVE DISC DISEASE), LUMBAR: ICD-10-CM

## 2023-10-31 DIAGNOSIS — I10 PRIMARY HYPERTENSION: Primary | ICD-10-CM

## 2023-10-31 DIAGNOSIS — E78.49 OTHER HYPERLIPIDEMIA: ICD-10-CM

## 2023-10-31 PROCEDURE — 3078F DIAST BP <80 MM HG: CPT | Performed by: FAMILY MEDICINE

## 2023-10-31 PROCEDURE — 1126F AMNT PAIN NOTED NONE PRSNT: CPT | Performed by: FAMILY MEDICINE

## 2023-10-31 PROCEDURE — 1159F MED LIST DOCD IN RCRD: CPT | Performed by: FAMILY MEDICINE

## 2023-10-31 PROCEDURE — 1036F TOBACCO NON-USER: CPT | Performed by: FAMILY MEDICINE

## 2023-10-31 PROCEDURE — 3074F SYST BP LT 130 MM HG: CPT | Performed by: FAMILY MEDICINE

## 2023-10-31 PROCEDURE — 99214 OFFICE O/P EST MOD 30 MIN: CPT | Performed by: FAMILY MEDICINE

## 2023-10-31 PROCEDURE — 1160F RVW MEDS BY RX/DR IN RCRD: CPT | Performed by: FAMILY MEDICINE

## 2023-10-31 RX ORDER — METFORMIN HYDROCHLORIDE 500 MG/1
500 TABLET ORAL 2 TIMES DAILY
Qty: 180 TABLET | Refills: 1 | Status: SHIPPED | OUTPATIENT
Start: 2023-10-31 | End: 2024-06-04 | Stop reason: SDUPTHER

## 2023-10-31 RX ORDER — GABAPENTIN 300 MG/1
300 CAPSULE ORAL NIGHTLY
Qty: 30 CAPSULE | Refills: 0 | Status: SHIPPED | OUTPATIENT
Start: 2023-10-31 | End: 2023-11-29 | Stop reason: SDUPTHER

## 2023-10-31 RX ORDER — ROSUVASTATIN CALCIUM 5 MG/1
5 TABLET, COATED ORAL DAILY
COMMUNITY
Start: 2023-08-14

## 2023-10-31 RX ORDER — AMLODIPINE BESYLATE 5 MG/1
5 TABLET ORAL DAILY
Qty: 90 TABLET | Refills: 1 | Status: SHIPPED | OUTPATIENT
Start: 2023-10-31 | End: 2024-05-14 | Stop reason: SDUPTHER

## 2023-10-31 ASSESSMENT — PATIENT HEALTH QUESTIONNAIRE - PHQ9
2. FEELING DOWN, DEPRESSED OR HOPELESS: NOT AT ALL
SUM OF ALL RESPONSES TO PHQ9 QUESTIONS 1 & 2: 0
1. LITTLE INTEREST OR PLEASURE IN DOING THINGS: NOT AT ALL

## 2023-10-31 ASSESSMENT — COLUMBIA-SUICIDE SEVERITY RATING SCALE - C-SSRS
6. HAVE YOU EVER DONE ANYTHING, STARTED TO DO ANYTHING, OR PREPARED TO DO ANYTHING TO END YOUR LIFE?: NO
1. IN THE PAST MONTH, HAVE YOU WISHED YOU WERE DEAD OR WISHED YOU COULD GO TO SLEEP AND NOT WAKE UP?: NO
2. HAVE YOU ACTUALLY HAD ANY THOUGHTS OF KILLING YOURSELF?: NO

## 2023-10-31 ASSESSMENT — LIFESTYLE VARIABLES: HOW OFTEN DO YOU HAVE A DRINK CONTAINING ALCOHOL: NEVER

## 2023-10-31 NOTE — PROGRESS NOTES
"Here for fu visit re: HTN DM HLD     compliant with meds     tolerating meds    not monitoring BP at home      denies chest pain SOB STEVENS diaphoresis nausea palpitations syncope presyncope orthopnea edema leg pain dysarthria aphasia focal weakness headache numbness tingling  visual disturbance gait disturbance polydipsia polyuria weight loss tremor epistaxis melena rectal bleeding tremor fatigue weight change temperature intolerance.        /72   Pulse 78   Temp 36.2 °C (97.2 °F)   Ht 1.676 m (5' 6\")   Wt 91.6 kg (202 lb)   SpO2 95%   BMI 32.60 kg/m²       Appears comfortable  No retractions  Skin without pallor petechia icterus cyanosis  Neck without JVD thyromegaly bruits  Chest clear to auscultation without rales rhonchi wheeze  Heart regular rate and rhythm without murmur  Abdomen soft nondistended nontender without organomegaly or mass  Extremities without erythema edema Homans or cord  Peripheral pulses palpable  Neuro intact      "

## 2023-11-29 DIAGNOSIS — M51.36 DDD (DEGENERATIVE DISC DISEASE), LUMBAR: ICD-10-CM

## 2023-11-29 RX ORDER — GABAPENTIN 300 MG/1
300 CAPSULE ORAL NIGHTLY
Qty: 30 CAPSULE | Refills: 0 | Status: SHIPPED | OUTPATIENT
Start: 2023-11-29 | End: 2024-05-27

## 2023-11-29 NOTE — TELEPHONE ENCOUNTER
Rx Refill Request Telephone Encounter    Name:  Mai Gonzalez  :  [id number]  Medication Name:  Gabapentin 300 mg capsule   Dose : 300 mg   Route : oral   Frequency : nightly   Quantity : 30 capsule  Directions :  Take 1 capsule (300 mg) by mouth once daily at bedtime.  Specific Pharmacy location:  [facility]

## 2024-01-18 ENCOUNTER — TELEPHONE (OUTPATIENT)
Dept: PRIMARY CARE | Facility: CLINIC | Age: 84
End: 2024-01-18
Payer: MEDICARE

## 2024-01-18 DIAGNOSIS — I10 HYPERTENSION, UNSPECIFIED TYPE: ICD-10-CM

## 2024-01-18 RX ORDER — ATENOLOL 50 MG/1
50 TABLET ORAL
Qty: 90 TABLET | Refills: 0 | Status: SHIPPED | OUTPATIENT
Start: 2024-01-18 | End: 2024-04-23 | Stop reason: SDUPTHER

## 2024-01-18 NOTE — TELEPHONE ENCOUNTER
Rx Refill Request Telephone Encounter    Name:  Mai Gonzalez  :  [id number]  Medication Name:  Atenolol  Dose : 50 Mg  Route : tablet  Frequency : 1 per day  Quantity : 90 tablets  Directions : take one tablet by mouth once every day  Specific Pharmacy location:  [facility] [place], in [place]  Date of last appointment:  10/31/23  Date of next appointment:  24

## 2024-04-23 ENCOUNTER — TELEPHONE (OUTPATIENT)
Dept: PRIMARY CARE | Facility: CLINIC | Age: 84
End: 2024-04-23
Payer: MEDICARE

## 2024-04-23 DIAGNOSIS — I10 HYPERTENSION, UNSPECIFIED TYPE: ICD-10-CM

## 2024-04-23 RX ORDER — ATENOLOL 50 MG/1
50 TABLET ORAL
Qty: 90 TABLET | Refills: 1 | Status: SHIPPED | OUTPATIENT
Start: 2024-04-23

## 2024-04-23 NOTE — TELEPHONE ENCOUNTER
Rx Refill Request Telephone Encounter    Name:  Mai Gonzalez  :  [id number]  Medication Name:  atenolol  Dose : 50 mg  Directions : Take 1 tablet (50 mg) by mouth once every day.  [facility]  Date of last appointment:  10/31/2023  Date of next appointment:  2024  Best number to reach patient:  [phone]

## 2024-04-30 ENCOUNTER — OFFICE VISIT (OUTPATIENT)
Dept: PRIMARY CARE | Facility: CLINIC | Age: 84
End: 2024-04-30
Payer: MEDICARE

## 2024-04-30 VITALS
HEART RATE: 71 BPM | TEMPERATURE: 97.3 F | DIASTOLIC BLOOD PRESSURE: 72 MMHG | WEIGHT: 198 LBS | BODY MASS INDEX: 31.82 KG/M2 | OXYGEN SATURATION: 96 % | SYSTOLIC BLOOD PRESSURE: 120 MMHG | HEIGHT: 66 IN

## 2024-04-30 DIAGNOSIS — Z00.00 ROUTINE GENERAL MEDICAL EXAMINATION AT HEALTH CARE FACILITY: Primary | ICD-10-CM

## 2024-04-30 DIAGNOSIS — E11.9 TYPE 2 DIABETES MELLITUS WITHOUT COMPLICATION, WITHOUT LONG-TERM CURRENT USE OF INSULIN (MULTI): ICD-10-CM

## 2024-04-30 DIAGNOSIS — Z78.0 ASYMPTOMATIC MENOPAUSAL STATE: ICD-10-CM

## 2024-04-30 LAB — POC HEMOGLOBIN A1C: 7.1 % (ref 4.2–6.5)

## 2024-04-30 PROCEDURE — 3078F DIAST BP <80 MM HG: CPT | Performed by: FAMILY MEDICINE

## 2024-04-30 PROCEDURE — 1160F RVW MEDS BY RX/DR IN RCRD: CPT | Performed by: FAMILY MEDICINE

## 2024-04-30 PROCEDURE — 1170F FXNL STATUS ASSESSED: CPT | Performed by: FAMILY MEDICINE

## 2024-04-30 PROCEDURE — 1159F MED LIST DOCD IN RCRD: CPT | Performed by: FAMILY MEDICINE

## 2024-04-30 PROCEDURE — G0439 PPPS, SUBSEQ VISIT: HCPCS | Performed by: FAMILY MEDICINE

## 2024-04-30 PROCEDURE — 3074F SYST BP LT 130 MM HG: CPT | Performed by: FAMILY MEDICINE

## 2024-04-30 PROCEDURE — 99214 OFFICE O/P EST MOD 30 MIN: CPT | Performed by: FAMILY MEDICINE

## 2024-04-30 PROCEDURE — 1157F ADVNC CARE PLAN IN RCRD: CPT | Performed by: FAMILY MEDICINE

## 2024-04-30 PROCEDURE — 83036 HEMOGLOBIN GLYCOSYLATED A1C: CPT | Performed by: FAMILY MEDICINE

## 2024-04-30 PROCEDURE — 1036F TOBACCO NON-USER: CPT | Performed by: FAMILY MEDICINE

## 2024-04-30 ASSESSMENT — PATIENT HEALTH QUESTIONNAIRE - PHQ9
2. FEELING DOWN, DEPRESSED OR HOPELESS: NOT AT ALL
2. FEELING DOWN, DEPRESSED OR HOPELESS: NOT AT ALL
1. LITTLE INTEREST OR PLEASURE IN DOING THINGS: NOT AT ALL
SUM OF ALL RESPONSES TO PHQ9 QUESTIONS 1 AND 2: 0
SUM OF ALL RESPONSES TO PHQ9 QUESTIONS 1 & 2: 0
1. LITTLE INTEREST OR PLEASURE IN DOING THINGS: NOT AT ALL

## 2024-04-30 ASSESSMENT — ACTIVITIES OF DAILY LIVING (ADL)
DRESSING: INDEPENDENT
BATHING: INDEPENDENT
GROCERY_SHOPPING: INDEPENDENT
MANAGING_FINANCES: INDEPENDENT
DOING_HOUSEWORK: INDEPENDENT
TAKING_MEDICATION: INDEPENDENT

## 2024-04-30 ASSESSMENT — ENCOUNTER SYMPTOMS
DEPRESSION: 0
OCCASIONAL FEELINGS OF UNSTEADINESS: 0
LOSS OF SENSATION IN FEET: 0

## 2024-04-30 NOTE — PROGRESS NOTES
"Subjective   Reason for Visit: Mai Gonzalez is an 83 y.o. female here for a Medicare Wellness visit.     Past Medical, Surgical, and Family History reviewed and updated in chart.    Reviewed all medications by prescribing practitioner or clinical pharmacist (such as prescriptions, OTCs, herbal therapies and supplements) and documented in the medical record.    HPI    Patient Care Team:  Gabino Ornelas MD as PCP - General (Family Medicine)     Review of Systems       denies chest pain SOB STEVENS diaphoresis nausea palpitations syncope presyncope orthopnea edema leg pain dysarthria aphasia focal weakness headache numbness tingling  visual disturbance gait disturbance polydipsia polyuria weight loss tremor epistaxis melena rectal bleeding tremor fatigue weight change temperature intolerance.    6 minutes spent with with patient discussing depression screening.  PHQ 2 is negative      /72   Pulse 71   Temp 36.3 °C (97.3 °F)   Ht 1.676 m (5' 6\")   Wt 89.8 kg (198 lb)   SpO2 96%   BMI 31.96 kg/m²         Physical Exam    Appears comfortable  No retractions  Skin without pallor petechia icterus cyanosis  Neck without JVD thyromegaly bruits  Chest clear to auscultation without rales rhonchi wheeze  Heart regular rate and rhythm without murmur  Abdomen soft nondistended nontender without organomegaly or mass  Extremities without erythema edema Homans or cord  Peripheral pulses palpable  Neuro intact  feet warm without pallor ulcerations erythema  Dorsalis pedis pulses palpable  Sensate to microfilament bilaterally    Assessment/Plan   Problem List Items Addressed This Visit       Diabetes (Multi)    Relevant Orders    Albumin , Urine Random    Comprehensive Metabolic Panel    Hemoglobin A1C    Lipid Panel     Other Visit Diagnoses       Routine general medical examination at health care facility    -  Primary    Asymptomatic menopausal state        Relevant Orders    XR DEXA bone density               "

## 2024-05-01 ENCOUNTER — LAB (OUTPATIENT)
Dept: LAB | Facility: LAB | Age: 84
End: 2024-05-01
Payer: MEDICARE

## 2024-05-01 DIAGNOSIS — E11.9 TYPE 2 DIABETES MELLITUS WITHOUT COMPLICATION, WITHOUT LONG-TERM CURRENT USE OF INSULIN (MULTI): ICD-10-CM

## 2024-05-01 LAB
ALBUMIN SERPL BCP-MCNC: 3.8 G/DL (ref 3.4–5)
ALP SERPL-CCNC: 65 U/L (ref 33–136)
ALT SERPL W P-5'-P-CCNC: 11 U/L (ref 7–45)
ANION GAP SERPL CALC-SCNC: 11 MMOL/L (ref 10–20)
AST SERPL W P-5'-P-CCNC: 11 U/L (ref 9–39)
BILIRUB SERPL-MCNC: 0.6 MG/DL (ref 0–1.2)
BUN SERPL-MCNC: 15 MG/DL (ref 6–23)
CALCIUM SERPL-MCNC: 9.1 MG/DL (ref 8.6–10.6)
CHLORIDE SERPL-SCNC: 109 MMOL/L (ref 98–107)
CHOLEST SERPL-MCNC: 120 MG/DL (ref 0–199)
CHOLESTEROL/HDL RATIO: 2.9
CO2 SERPL-SCNC: 29 MMOL/L (ref 21–32)
CREAT SERPL-MCNC: 0.74 MG/DL (ref 0.5–1.05)
CREAT UR-MCNC: 139.1 MG/DL (ref 20–320)
EGFRCR SERPLBLD CKD-EPI 2021: 79 ML/MIN/1.73M*2
GLUCOSE SERPL-MCNC: 128 MG/DL (ref 74–99)
HDLC SERPL-MCNC: 40.7 MG/DL
LDLC SERPL CALC-MCNC: 52 MG/DL
MICROALBUMIN UR-MCNC: 158.9 MG/L
MICROALBUMIN/CREAT UR: 114.2 UG/MG CREAT
NON HDL CHOLESTEROL: 79 MG/DL (ref 0–149)
POTASSIUM SERPL-SCNC: 4 MMOL/L (ref 3.5–5.3)
PROT SERPL-MCNC: 5.9 G/DL (ref 6.4–8.2)
SODIUM SERPL-SCNC: 145 MMOL/L (ref 136–145)
TRIGL SERPL-MCNC: 139 MG/DL (ref 0–149)
VLDL: 28 MG/DL (ref 0–40)

## 2024-05-01 PROCEDURE — 80053 COMPREHEN METABOLIC PANEL: CPT

## 2024-05-01 PROCEDURE — 82043 UR ALBUMIN QUANTITATIVE: CPT

## 2024-05-01 PROCEDURE — 36415 COLL VENOUS BLD VENIPUNCTURE: CPT

## 2024-05-01 PROCEDURE — 82570 ASSAY OF URINE CREATININE: CPT

## 2024-05-01 PROCEDURE — 80061 LIPID PANEL: CPT

## 2024-05-03 ENCOUNTER — TELEPHONE (OUTPATIENT)
Dept: PRIMARY CARE | Facility: CLINIC | Age: 84
End: 2024-05-03
Payer: MEDICARE

## 2024-05-03 NOTE — TELEPHONE ENCOUNTER
Patient notified   ----- Message from Gabino Ornelas MD sent at 5/1/2024  3:23 PM EDT -----  Notify patient that labs are at goal

## 2024-05-08 ENCOUNTER — HOSPITAL ENCOUNTER (OUTPATIENT)
Dept: RADIOLOGY | Facility: CLINIC | Age: 84
Discharge: HOME | End: 2024-05-08
Payer: MEDICARE

## 2024-05-08 DIAGNOSIS — Z78.0 ASYMPTOMATIC MENOPAUSAL STATE: ICD-10-CM

## 2024-05-08 PROCEDURE — 77080 DXA BONE DENSITY AXIAL: CPT

## 2024-05-08 PROCEDURE — 77080 DXA BONE DENSITY AXIAL: CPT | Performed by: RADIOLOGY

## 2024-05-09 ENCOUNTER — TELEPHONE (OUTPATIENT)
Dept: PRIMARY CARE | Facility: CLINIC | Age: 84
End: 2024-05-09
Payer: MEDICARE

## 2024-05-09 NOTE — TELEPHONE ENCOUNTER
Patient notified   ----- Message from Gabino Ornelas MD sent at 5/8/2024 12:29 PM EDT -----  Your bone density test reveals that you have moderately low bone density, called osteopenia.  This is not as severe as osteoporosis .  Therefore you do not require prescription medication for treatment.  I recommend calcium 1200 mg daily and vitamin D 800-1000 international units daily.  Regular strength training exercise may also be helpful.  You should repeat this bone density test in 2 years.

## 2024-05-13 DIAGNOSIS — I10 HYPERTENSION, UNSPECIFIED TYPE: ICD-10-CM

## 2024-05-13 NOTE — TELEPHONE ENCOUNTER
Rx Refill Request Telephone Encounter    Name:  Mai Gonzalez  :  [id number]  Medication Name:  Amlodipine 5 mg tablet   Dose : 5 mg  Route : oral  Frequency : daily  Quantity : 90 tablet   Directions : Take 1 tablet (5 mg) by mouth once daily. For Hypertension  Specific Pharmacy location:  [facility]

## 2024-05-14 RX ORDER — AMLODIPINE BESYLATE 5 MG/1
5 TABLET ORAL DAILY
Qty: 90 TABLET | Refills: 1 | Status: SHIPPED | OUTPATIENT
Start: 2024-05-14

## 2024-06-03 ENCOUNTER — TELEPHONE (OUTPATIENT)
Dept: PRIMARY CARE | Facility: CLINIC | Age: 84
End: 2024-06-03
Payer: MEDICARE

## 2024-06-03 DIAGNOSIS — E11.9 TYPE 2 DIABETES MELLITUS WITHOUT COMPLICATION, WITHOUT LONG-TERM CURRENT USE OF INSULIN (MULTI): ICD-10-CM

## 2024-06-03 NOTE — TELEPHONE ENCOUNTER
Rx Refill Request Telephone Encounter    Name:  Mai Gonzalez  :  [id number]  Medication Name:  Metformin 500 mg tablet   Dose : 500 mg  Route : oral  Frequency : 2 times daily   Quantity : 180  tablet   Directions : Take 1 tablet (500 mg) by mouth 2 times a day.  Specific Pharmacy location:  [facility]

## 2024-06-04 RX ORDER — METFORMIN HYDROCHLORIDE 500 MG/1
500 TABLET ORAL 2 TIMES DAILY
Qty: 180 TABLET | Refills: 1 | Status: SHIPPED | OUTPATIENT
Start: 2024-06-04 | End: 2024-12-01

## 2024-08-29 DIAGNOSIS — E78.49 OTHER HYPERLIPIDEMIA: Primary | ICD-10-CM

## 2024-08-29 NOTE — TELEPHONE ENCOUNTER
Rx Refill Request Telephone Encounter    Name:  Mai Gonzalez  :  [id number]  Medication Name: Rosuvastatin 5 MG        #90 Refill: 1  Take 1 tablet daily  Specific Pharmacy location: [facility]  Date of last appointment: 24  Date of next appointment: 10.29.24  Best number to reach patient:

## 2024-08-30 RX ORDER — ROSUVASTATIN CALCIUM 5 MG/1
5 TABLET, COATED ORAL DAILY
Qty: 90 TABLET | Refills: 1 | Status: SHIPPED | OUTPATIENT
Start: 2024-08-30

## 2024-09-10 DIAGNOSIS — E78.5 HYPERLIPIDEMIA, UNSPECIFIED: ICD-10-CM

## 2024-09-10 RX ORDER — EZETIMIBE 10 MG/1
10 TABLET ORAL DAILY
Qty: 90 TABLET | Refills: 3 | Status: SHIPPED | OUTPATIENT
Start: 2024-09-10

## 2024-09-10 NOTE — TELEPHONE ENCOUNTER
Guerita, could you please contact this patient and encourage her to schedule a follow up visit with Dr. Benitez for further refills? Thank you!

## 2024-09-19 ENCOUNTER — OFFICE VISIT (OUTPATIENT)
Dept: CARDIOLOGY | Facility: CLINIC | Age: 84
End: 2024-09-19
Payer: MEDICARE

## 2024-09-19 VITALS
OXYGEN SATURATION: 97 % | SYSTOLIC BLOOD PRESSURE: 121 MMHG | HEART RATE: 69 BPM | HEIGHT: 65 IN | DIASTOLIC BLOOD PRESSURE: 71 MMHG | BODY MASS INDEX: 31.99 KG/M2 | WEIGHT: 192 LBS

## 2024-09-19 DIAGNOSIS — I10 PRIMARY HYPERTENSION: Primary | ICD-10-CM

## 2024-09-19 DIAGNOSIS — E78.00 PURE HYPERCHOLESTEROLEMIA: ICD-10-CM

## 2024-09-19 DIAGNOSIS — E11.9 TYPE 2 DIABETES MELLITUS WITHOUT COMPLICATION, WITHOUT LONG-TERM CURRENT USE OF INSULIN (MULTI): ICD-10-CM

## 2024-09-19 LAB
ATRIAL RATE: 68 BPM
P AXIS: 51 DEGREES
P OFFSET: 198 MS
P ONSET: 140 MS
PR INTERVAL: 160 MS
Q ONSET: 220 MS
QRS COUNT: 11 BEATS
QRS DURATION: 78 MS
QT INTERVAL: 408 MS
QTC CALCULATION(BAZETT): 433 MS
QTC FREDERICIA: 425 MS
R AXIS: 40 DEGREES
T AXIS: 22 DEGREES
T OFFSET: 424 MS
VENTRICULAR RATE: 68 BPM

## 2024-09-19 PROCEDURE — 99214 OFFICE O/P EST MOD 30 MIN: CPT | Performed by: INTERNAL MEDICINE

## 2024-09-19 PROCEDURE — 1160F RVW MEDS BY RX/DR IN RCRD: CPT | Performed by: INTERNAL MEDICINE

## 2024-09-19 PROCEDURE — 3074F SYST BP LT 130 MM HG: CPT | Performed by: INTERNAL MEDICINE

## 2024-09-19 PROCEDURE — 93005 ELECTROCARDIOGRAM TRACING: CPT | Performed by: INTERNAL MEDICINE

## 2024-09-19 PROCEDURE — 1159F MED LIST DOCD IN RCRD: CPT | Performed by: INTERNAL MEDICINE

## 2024-09-19 PROCEDURE — 1125F AMNT PAIN NOTED PAIN PRSNT: CPT | Performed by: INTERNAL MEDICINE

## 2024-09-19 PROCEDURE — 1157F ADVNC CARE PLAN IN RCRD: CPT | Performed by: INTERNAL MEDICINE

## 2024-09-19 PROCEDURE — 1036F TOBACCO NON-USER: CPT | Performed by: INTERNAL MEDICINE

## 2024-09-19 PROCEDURE — G2211 COMPLEX E/M VISIT ADD ON: HCPCS | Performed by: INTERNAL MEDICINE

## 2024-09-19 PROCEDURE — 3078F DIAST BP <80 MM HG: CPT | Performed by: INTERNAL MEDICINE

## 2024-09-19 ASSESSMENT — PAIN SCALES - GENERAL: PAINLEVEL: 4

## 2024-09-19 NOTE — PROGRESS NOTES
Primary Care Physician: Gabino Ornelas MD  Date of Visit: 09/19/2024 11:00 AM EDT  [place] [facility] [facility] [place]   Last office visit: August 14, 2023    Chief Complaint:     Cardiovascular risk (annual)    HPI/Summary  Mai Gonzalez is a 84 y.o. female who presents for followup cardiology evaluation.     She presents with statin intolerance, and a problem list that includes hypertension, hyperlipidemia and type 2 diabetes.  At her last visit, she was on monotherapy with ezetimibe.  We recommended that she try rosuvastatin 5 mg daily along with the ezetimibe.    Currently, she is treated with low-dose aspirin, amlodipine 5 mg daily, atenolol 50 mg daily, rosuvastatin 5 mg daily, ezetimibe 10 mg daily and metformin 500 mg twice daily.    Recent laboratory review: Lipid panel on May 4, 2024 shows a cholesterol of 120, HDL 41, LDL 52, triglycerides 139.  This reflects a greater than 60% LDL reduction after addition of low-dose rosuvastatin.  Hemoglobin A1c was 7.1% in April, 2024.    Water aerobics 3 times weekly for 1 hour at [facility]. , daughter lives with her in  home.  Plays bridge 3 times weekly.  Specialty Problems          Cardiology Problems    Bruit    Hyperlipidemia    Hypertension        Past Medical History:   Diagnosis Date    Adverse effect of unspecified systemic antibiotic, initial encounter 02/28/2017    Antibiotics causing adverse effect in therapeutic use, initial encounter    Aftercare following joint replacement surgery 05/16/2018    Aftercare following right hip joint replacement surgery    Body mass index (BMI) 33.0-33.9, adult 02/23/2022    BMI 33.0-33.9,adult    Body mass index (BMI) 33.0-33.9, adult     BMI 33.0-33.9,adult    Candidiasis of skin and nail 10/25/2019    Candidal intertrigo    Encounter for other screening for malignant neoplasm of breast     Screening for breast cancer    Fracture of unspecified carpal bone, right wrist, initial encounter for closed  fracture 2015    Wrist fracture, right    Malignant neoplasm of upper-outer quadrant of left female breast (Multi) 2016    Breast cancer of upper-outer quadrant of left female breast    Other conditions influencing health status 2016    History of cough    Personal history of diseases of the skin and subcutaneous tissue 2017    History of urticaria    Personal history of other diseases of the respiratory system 2017    History of acute sinusitis    Personal history of other diseases of the respiratory system 2016    History of acute bronchitis    Personal history of other specified conditions 2018    History of fatigue    Personal history of other specified conditions 2019    History of dysuria          Past Surgical History:   Procedure Laterality Date    ADENOIDECTOMY  2017    Adenoidectomy    HIP SURGERY  2015    Hip Surgery Right    KNEE SURGERY  2019    Knee Surgery Left    OTHER SURGICAL HISTORY  2015    History Of Prior Surgery    OTHER SURGICAL HISTORY  2015    Dental Surgery    OTHER SURGICAL HISTORY  2015    Hip Surgery Left    OTHER SURGICAL HISTORY  2015    Treatment Of The Right Leg    OTHER SURGICAL HISTORY  2019    Cosmetic surgery    OTHER SURGICAL HISTORY  2015    Left Breast Partial Mastectomy    SENTINEL LYMPH NODE BIOPSY  2015    Tatums Lymph Node Biopsy    TONSILLECTOMY  2017    Tonsillectomy            Social History     Tobacco Use    Smoking status: Former     Current packs/day: 0.00     Types: Cigarettes     Quit date: 1990     Years since quittin.4    Smokeless tobacco: Never   Substance Use Topics    Alcohol use: Not Currently    Drug use: Never                 Allergies   Allergen Reactions    Ace Inhibitors Swelling    Amoxicillin Angioedema, Swelling, Hives and Itching    Atorvastatin Unknown    Colesevelam Unknown    Red Yeast Rice Hives and Swelling     "Rosuvastatin Unknown    Venlafaxine Hives         Current Outpatient Medications   Medication Instructions    albuterol 90 mcg/actuation inhaler 2 puffs, inhalation, Every 4 hours PRN    amLODIPine (NORVASC) 5 mg, oral, Daily, For Hypertension    ascorbic acid (VITAMIN C ORAL) 1,000 mg, oral, Every Day    aspirin 81 mg EC tablet 1 tablet, oral, Every Day    atenolol (TENORMIN) 50 mg, oral, Every Day    blood glucose control, normal solution miscellaneous, Use  as directed. Testing every day, Non-Insulin    blood sugar diagnostic (Contour Next Test Strips) strip Testing 1 x daily Non Insulin Dependent Diabetes 2    budesonide-formoteroL (Symbicort) 160-4.5 mcg/actuation inhaler 2 puffs, inhalation, 2 times daily PRN, Rinse mouth after use    calcium carbonate (CORAL CALCIUM ORAL) 2 tablets, oral, Every Day    cholecalciferol (Vitamin D-3) 50 mcg (2,000 unit) capsule 1 capsule, oral, Every Day    ezetimibe (ZETIA) 10 mg, oral, Daily    fluticasone (Flonase) 50 mcg/actuation nasal spray 1 spray, Each Nostril, 2 times daily PRN, For: Health Maintenance    gabapentin (NEURONTIN) 300 mg, oral, Nightly    lancets misc miscellaneous, Testing once daily    loratadine (Claritin) 10 mg tablet 1 tablet, oral, Daily PRN    metFORMIN (GLUCOPHAGE) 500 mg, oral, 2 times daily    omega-3/dha/epa/fish oil (OMEGA-3 ORAL) 300 mg, Take 2 capsules (600 mg) once every day.    rosuvastatin (CRESTOR) 5 mg, oral, Daily       ROS     Vital Signs:  Vitals:    09/19/24 1151   BP: 121/71   BP Location: Right arm   Patient Position: Sitting   Pulse: 69   SpO2: 97%   Weight: 87.1 kg (192 lb)   Height: 1.651 m (5' 5\")     Wt Readings from Last 2 Encounters:   09/19/24 87.1 kg (192 lb)   04/30/24 89.8 kg (198 lb)     Body mass index is 31.95 kg/m².       Physical Exam:    On examination she appeared in good health and spirits. Vital signs as documented. Skin warm and dry and without overt rashes. Neck without JVD. Lungs clear. Heart exam notable for " "regular rhythm, normal sounds and absence of murmurs, rubs or gallops. Abdomen unremarkable and without evidence of organomegaly, masses, or abdominal aortic enlargement. Extremities nonedematous.     Last Labs:  CMP:  Recent Labs     05/01/24  0958 06/14/23  0939 03/08/23  1359 05/18/22  1026 04/27/21  0954    144 145 145 142   K 4.0 4.0 4.1 4.2 4.0   * 108* 107 109* 109*   CO2 29 26 30 26 23   ANIONGAP 11 14 12 14 14   BUN 15 13 15 18 17   CREATININE 0.74 0.86 0.84 0.78 0.78   EGFR 79  --   --   --   --    GLUCOSE 128* 123* 102* 134* 182*     Recent Labs     05/01/24  0958 03/08/23  1359 05/18/22  1026 04/27/21  0954 08/12/20  1001   ALBUMIN 3.8 4.5 3.8 4.0 3.9   ALKPHOS 65 60 72 66 61   ALT 11 13 11 23 15   AST 11 13 12 15 11   BILITOT 0.6 0.4 0.7 0.6 0.8     CBC:  Recent Labs     06/14/23  0939 05/18/22  1026 04/27/21  0954 08/12/20  1001 07/17/19  1001   WBC 8.8 8.9 7.1 8.5 8.1   HGB 13.8 13.6 14.1 14.6 14.2   HCT 43.4 42.0 43.7 44.3 46.0    212 192 203 220   MCV 93 93 97 94 97     COAG: No results for input(s): \"INR\", \"DDIMERVTE\" in the last 81269 hours.  HEME/ENDO:  Recent Labs     04/30/24  1212 06/14/23  0939 05/18/22  1026 04/27/21  0954 08/12/20  1001   TSH  --  3.15 2.13 2.83 2.61   HGBA1C 7.1* 6.7* 6.5* 7.7  --       CARDIAC: No results for input(s): \"LDH\", \"CKMB\", \"TROPHS\", \"BNP\" in the last 95844 hours.    No lab exists for component: \"CK\", \"CKMBP\"  Recent Labs     05/01/24  0958 09/21/23  0953 06/14/23  0939 05/18/22  1026   CHOL 120 122 214* 179   LDLF  --  59 131* 121*   HDL 40.7 43.6 40.1 33.8*   TRIG 139 95 216* 123       Last Cardiology Tests:    ECG:    Normal sinus rhythm, normal ECG.    Echo:  Echo Results:  No results found for this or any previous visit from the past 3650 days.       Cath:      Stress Test:  Stress Results:  No results found for this or any previous visit from the past 365 days.         Cardiac Imaging:        Assessment/Plan     The patient's risk " factors for CAD have been addressed.  Lipids are well-controlled, blood pressure is at goal, and she is doing well on metformin for management of diabetes with an acceptable hemoglobin A1c.  She reports no medication related side effects.  She remains active mentally and physically.  The ECG is normal.  Do not recommend any additional testing at this time.  Follow-up can be through her primary care provider, or in 1 year if she prefers to return to cardiology clinic.      Orders:  Orders Placed This Encounter   Procedures    ECG 12 lead (Clinic Performed)      Followup Appts:  Future Appointments   Date Time Provider Department Center   10/29/2024 11:00 AM Gabino Ornelas MD [id number]           ____________________________________________________________  Francis Benitez MD    Senior Attending Physician  [facility]  [facility]    [facility] Chair for Cardiovascular Excellence  [employer]

## 2024-10-03 ENCOUNTER — TELEPHONE (OUTPATIENT)
Dept: PRIMARY CARE | Facility: CLINIC | Age: 84
End: 2024-10-03
Payer: MEDICARE

## 2024-10-03 DIAGNOSIS — E78.5 HYPERLIPIDEMIA, UNSPECIFIED: ICD-10-CM

## 2024-10-03 RX ORDER — EZETIMIBE 10 MG/1
10 TABLET ORAL DAILY
Qty: 90 TABLET | Refills: 3 | Status: SHIPPED | OUTPATIENT
Start: 2024-10-03

## 2024-10-03 NOTE — TELEPHONE ENCOUNTER
Rx Refill Request Telephone Encounter    Name:  Mai Gonzalez  :  [id number]  Medication Name:  ZETIA   10 MG          Specific Pharmacy location:  [facility]  Date of last appointment:  2024  Date of next appointment:  10/29/2024  Best number to reach patient:

## 2024-10-19 DIAGNOSIS — I10 HYPERTENSION, UNSPECIFIED TYPE: ICD-10-CM

## 2024-10-21 RX ORDER — ATENOLOL 50 MG/1
50 TABLET ORAL DAILY
Qty: 90 TABLET | Refills: 1 | Status: SHIPPED | OUTPATIENT
Start: 2024-10-21

## 2024-10-29 ENCOUNTER — APPOINTMENT (OUTPATIENT)
Dept: PRIMARY CARE | Facility: CLINIC | Age: 84
End: 2024-10-29
Payer: MEDICARE

## 2024-10-30 ENCOUNTER — APPOINTMENT (OUTPATIENT)
Dept: PRIMARY CARE | Facility: CLINIC | Age: 84
End: 2024-10-30
Payer: MEDICARE

## 2024-10-30 VITALS
BODY MASS INDEX: 31.16 KG/M2 | HEIGHT: 65 IN | DIASTOLIC BLOOD PRESSURE: 82 MMHG | OXYGEN SATURATION: 95 % | HEART RATE: 72 BPM | SYSTOLIC BLOOD PRESSURE: 124 MMHG | TEMPERATURE: 97.1 F | WEIGHT: 187 LBS

## 2024-10-30 DIAGNOSIS — E11.9 TYPE 2 DIABETES MELLITUS WITHOUT COMPLICATION, WITHOUT LONG-TERM CURRENT USE OF INSULIN (MULTI): Primary | ICD-10-CM

## 2024-10-30 DIAGNOSIS — J40 BRONCHITIS: ICD-10-CM

## 2024-10-30 DIAGNOSIS — J44.1 CHRONIC OBSTRUCTIVE PULMONARY DISEASE WITH ACUTE EXACERBATION (MULTI): ICD-10-CM

## 2024-10-30 LAB — POC HEMOGLOBIN A1C: 6.2 % (ref 4.2–6.5)

## 2024-10-30 PROCEDURE — 94640 AIRWAY INHALATION TREATMENT: CPT | Performed by: FAMILY MEDICINE

## 2024-10-30 PROCEDURE — 1157F ADVNC CARE PLAN IN RCRD: CPT | Performed by: FAMILY MEDICINE

## 2024-10-30 PROCEDURE — 1036F TOBACCO NON-USER: CPT | Performed by: FAMILY MEDICINE

## 2024-10-30 PROCEDURE — 3074F SYST BP LT 130 MM HG: CPT | Performed by: FAMILY MEDICINE

## 2024-10-30 PROCEDURE — 1160F RVW MEDS BY RX/DR IN RCRD: CPT | Performed by: FAMILY MEDICINE

## 2024-10-30 PROCEDURE — 1123F ACP DISCUSS/DSCN MKR DOCD: CPT | Performed by: FAMILY MEDICINE

## 2024-10-30 PROCEDURE — 1158F ADVNC CARE PLAN TLK DOCD: CPT | Performed by: FAMILY MEDICINE

## 2024-10-30 PROCEDURE — 83036 HEMOGLOBIN GLYCOSYLATED A1C: CPT | Performed by: FAMILY MEDICINE

## 2024-10-30 PROCEDURE — 3079F DIAST BP 80-89 MM HG: CPT | Performed by: FAMILY MEDICINE

## 2024-10-30 PROCEDURE — 99214 OFFICE O/P EST MOD 30 MIN: CPT | Performed by: FAMILY MEDICINE

## 2024-10-30 PROCEDURE — 1159F MED LIST DOCD IN RCRD: CPT | Performed by: FAMILY MEDICINE

## 2024-10-30 RX ORDER — ALBUTEROL SULFATE 0.83 MG/ML
2.5 SOLUTION RESPIRATORY (INHALATION) ONCE
Status: COMPLETED | OUTPATIENT
Start: 2024-10-30 | End: 2024-10-30

## 2024-10-30 RX ORDER — PREDNISONE 20 MG/1
40 TABLET ORAL DAILY
Qty: 10 TABLET | Refills: 0 | Status: SHIPPED | OUTPATIENT
Start: 2024-10-30 | End: 2024-11-04

## 2024-10-30 RX ORDER — AZITHROMYCIN 250 MG/1
TABLET, FILM COATED ORAL
Qty: 6 TABLET | Refills: 0 | Status: SHIPPED | OUTPATIENT
Start: 2024-10-30 | End: 2024-11-04

## 2024-10-30 ASSESSMENT — PATIENT HEALTH QUESTIONNAIRE - PHQ9
SUM OF ALL RESPONSES TO PHQ9 QUESTIONS 1 & 2: 0
2. FEELING DOWN, DEPRESSED OR HOPELESS: NOT AT ALL
1. LITTLE INTEREST OR PLEASURE IN DOING THINGS: NOT AT ALL

## 2024-11-09 DIAGNOSIS — I10 HYPERTENSION, UNSPECIFIED TYPE: ICD-10-CM

## 2024-11-11 RX ORDER — AMLODIPINE BESYLATE 5 MG/1
5 TABLET ORAL DAILY
Qty: 90 TABLET | Refills: 1 | Status: SHIPPED | OUTPATIENT
Start: 2024-11-11

## 2024-12-08 DIAGNOSIS — E11.9 TYPE 2 DIABETES MELLITUS WITHOUT COMPLICATION, WITHOUT LONG-TERM CURRENT USE OF INSULIN (MULTI): ICD-10-CM

## 2024-12-09 RX ORDER — METFORMIN HYDROCHLORIDE 500 MG/1
500 TABLET ORAL 2 TIMES DAILY
Qty: 180 TABLET | Refills: 1 | Status: SHIPPED | OUTPATIENT
Start: 2024-12-09 | End: 2025-06-07

## 2024-12-23 ENCOUNTER — OFFICE VISIT (OUTPATIENT)
Dept: PRIMARY CARE | Facility: CLINIC | Age: 84
End: 2024-12-23
Payer: MEDICARE

## 2024-12-23 VITALS
TEMPERATURE: 96.8 F | SYSTOLIC BLOOD PRESSURE: 137 MMHG | HEART RATE: 80 BPM | HEIGHT: 65 IN | OXYGEN SATURATION: 96 % | WEIGHT: 189 LBS | BODY MASS INDEX: 31.49 KG/M2 | DIASTOLIC BLOOD PRESSURE: 87 MMHG

## 2024-12-23 DIAGNOSIS — J44.1 COPD EXACERBATION (MULTI): Primary | ICD-10-CM

## 2024-12-23 PROCEDURE — 1036F TOBACCO NON-USER: CPT | Performed by: FAMILY MEDICINE

## 2024-12-23 PROCEDURE — 1157F ADVNC CARE PLAN IN RCRD: CPT | Performed by: FAMILY MEDICINE

## 2024-12-23 PROCEDURE — 1123F ACP DISCUSS/DSCN MKR DOCD: CPT | Performed by: FAMILY MEDICINE

## 2024-12-23 PROCEDURE — 1159F MED LIST DOCD IN RCRD: CPT | Performed by: FAMILY MEDICINE

## 2024-12-23 PROCEDURE — 3079F DIAST BP 80-89 MM HG: CPT | Performed by: FAMILY MEDICINE

## 2024-12-23 PROCEDURE — 1160F RVW MEDS BY RX/DR IN RCRD: CPT | Performed by: FAMILY MEDICINE

## 2024-12-23 PROCEDURE — 94640 AIRWAY INHALATION TREATMENT: CPT | Performed by: FAMILY MEDICINE

## 2024-12-23 PROCEDURE — 99214 OFFICE O/P EST MOD 30 MIN: CPT | Performed by: FAMILY MEDICINE

## 2024-12-23 PROCEDURE — 3075F SYST BP GE 130 - 139MM HG: CPT | Performed by: FAMILY MEDICINE

## 2024-12-23 RX ORDER — ALBUTEROL SULFATE 0.83 MG/ML
2.5 SOLUTION RESPIRATORY (INHALATION) ONCE
Status: COMPLETED | OUTPATIENT
Start: 2024-12-23 | End: 2024-12-23

## 2024-12-23 RX ORDER — PREDNISONE 20 MG/1
40 TABLET ORAL DAILY
Qty: 10 TABLET | Refills: 0 | Status: SHIPPED | OUTPATIENT
Start: 2024-12-23 | End: 2024-12-28

## 2024-12-23 RX ORDER — AZITHROMYCIN 250 MG/1
TABLET, FILM COATED ORAL
Qty: 6 TABLET | Refills: 0 | Status: SHIPPED | OUTPATIENT
Start: 2024-12-23 | End: 2024-12-28

## 2024-12-23 RX ORDER — ALBUTEROL SULFATE 0.83 MG/ML
2.5 SOLUTION RESPIRATORY (INHALATION) EVERY 6 HOURS PRN
Qty: 75 ML | Refills: 1 | Status: SHIPPED | OUTPATIENT
Start: 2024-12-23 | End: 2025-12-23

## 2024-12-23 ASSESSMENT — PATIENT HEALTH QUESTIONNAIRE - PHQ9
1. LITTLE INTEREST OR PLEASURE IN DOING THINGS: NOT AT ALL
SUM OF ALL RESPONSES TO PHQ9 QUESTIONS 1 & 2: 0
2. FEELING DOWN, DEPRESSED OR HOPELESS: NOT AT ALL

## 2024-12-23 NOTE — PROGRESS NOTES
"Complaining of productive cough with yellow sputum and  chest congestion  for  3  days no improvement       Denies chest pain  Denies fever  Denies shortness of breath  Denies myalgias   Denies headache   Denies nasal congestion facial pressure rhinorrhea   Denies otalgia otorrhea  Denies anosmia  Denies nausea vomiting diarrhea abdominal pain diarrhea    PMH :              + COPD    SH : tobacco use :  former     /87   Pulse 80   Temp 36 °C (96.8 °F)   Ht 1.651 m (5' 5\")   Wt 85.7 kg (189 lb)   SpO2 96%   BMI 31.45 kg/m²      Mildly ill-appearing  but nontoxic-appearing  Skin without cyanosis pallor icterus petechia  Respirations normal without retractions  Oropharynx red without exudates  Sinuses nontender TMs normal  No rhinorrhea   Chest with scattered rhonchi expiratory wheezing without rales  Heart regular rate and rhythm without murmur  Abdomen soft nondistended nontender without organomegaly or mass  Extremities without erythema edema Homans or cord            "

## 2025-02-13 DIAGNOSIS — I10 HYPERTENSION, UNSPECIFIED TYPE: ICD-10-CM

## 2025-02-13 RX ORDER — ATENOLOL 50 MG/1
50 TABLET ORAL DAILY
Qty: 90 TABLET | Refills: 0 | Status: SHIPPED | OUTPATIENT
Start: 2025-02-13

## 2025-02-24 DIAGNOSIS — E78.49 OTHER HYPERLIPIDEMIA: ICD-10-CM

## 2025-02-24 RX ORDER — ROSUVASTATIN CALCIUM 5 MG/1
5 TABLET, COATED ORAL DAILY
Qty: 90 TABLET | Refills: 1 | Status: SHIPPED | OUTPATIENT
Start: 2025-02-24

## 2025-04-28 ENCOUNTER — APPOINTMENT (OUTPATIENT)
Dept: PRIMARY CARE | Facility: CLINIC | Age: 85
End: 2025-04-28
Payer: MEDICARE

## 2025-04-28 VITALS
TEMPERATURE: 97.1 F | HEIGHT: 65 IN | OXYGEN SATURATION: 96 % | DIASTOLIC BLOOD PRESSURE: 76 MMHG | BODY MASS INDEX: 31.16 KG/M2 | SYSTOLIC BLOOD PRESSURE: 136 MMHG | HEART RATE: 71 BPM | WEIGHT: 187 LBS

## 2025-04-28 DIAGNOSIS — J43.9 PULMONARY EMPHYSEMA, UNSPECIFIED EMPHYSEMA TYPE (MULTI): ICD-10-CM

## 2025-04-28 DIAGNOSIS — Z23 NEED FOR VACCINATION: ICD-10-CM

## 2025-04-28 DIAGNOSIS — Z00.00 ROUTINE GENERAL MEDICAL EXAMINATION AT HEALTH CARE FACILITY: Primary | ICD-10-CM

## 2025-04-28 DIAGNOSIS — E11.9 TYPE 2 DIABETES MELLITUS WITHOUT COMPLICATION, WITHOUT LONG-TERM CURRENT USE OF INSULIN: ICD-10-CM

## 2025-04-28 PROBLEM — J44.9 COPD (CHRONIC OBSTRUCTIVE PULMONARY DISEASE) (MULTI): Status: RESOLVED | Noted: 2023-01-24 | Resolved: 2025-04-28

## 2025-04-28 PROCEDURE — 1159F MED LIST DOCD IN RCRD: CPT | Performed by: FAMILY MEDICINE

## 2025-04-28 PROCEDURE — 3078F DIAST BP <80 MM HG: CPT | Performed by: FAMILY MEDICINE

## 2025-04-28 PROCEDURE — G0444 DEPRESSION SCREEN ANNUAL: HCPCS | Performed by: FAMILY MEDICINE

## 2025-04-28 PROCEDURE — G0439 PPPS, SUBSEQ VISIT: HCPCS | Performed by: FAMILY MEDICINE

## 2025-04-28 PROCEDURE — 1123F ACP DISCUSS/DSCN MKR DOCD: CPT | Performed by: FAMILY MEDICINE

## 2025-04-28 PROCEDURE — 1157F ADVNC CARE PLAN IN RCRD: CPT | Performed by: FAMILY MEDICINE

## 2025-04-28 PROCEDURE — 3075F SYST BP GE 130 - 139MM HG: CPT | Performed by: FAMILY MEDICINE

## 2025-04-28 PROCEDURE — 1170F FXNL STATUS ASSESSED: CPT | Performed by: FAMILY MEDICINE

## 2025-04-28 PROCEDURE — 99214 OFFICE O/P EST MOD 30 MIN: CPT | Performed by: FAMILY MEDICINE

## 2025-04-28 PROCEDURE — 1160F RVW MEDS BY RX/DR IN RCRD: CPT | Performed by: FAMILY MEDICINE

## 2025-04-28 ASSESSMENT — ACTIVITIES OF DAILY LIVING (ADL)
BATHING: INDEPENDENT
DRESSING: INDEPENDENT
DOING_HOUSEWORK: INDEPENDENT
TAKING_MEDICATION: INDEPENDENT
GROCERY_SHOPPING: INDEPENDENT
MANAGING_FINANCES: INDEPENDENT

## 2025-04-28 ASSESSMENT — PATIENT HEALTH QUESTIONNAIRE - PHQ9
2. FEELING DOWN, DEPRESSED OR HOPELESS: NOT AT ALL
1. LITTLE INTEREST OR PLEASURE IN DOING THINGS: NOT AT ALL
SUM OF ALL RESPONSES TO PHQ9 QUESTIONS 1 AND 2: 0

## 2025-04-28 ASSESSMENT — ENCOUNTER SYMPTOMS
DEPRESSION: 0
OCCASIONAL FEELINGS OF UNSTEADINESS: 0
LOSS OF SENSATION IN FEET: 0

## 2025-04-28 NOTE — ASSESSMENT & PLAN NOTE
Orders:    Albumin-Creatinine Ratio, Urine Random; Future    Comprehensive Metabolic Panel; Future    Hemoglobin A1C; Future    Lipid Panel; Future    Tsh With Reflex To Free T4 If Abnormal; Future

## 2025-04-28 NOTE — PROGRESS NOTES
"Subjective   Reason for Visit: Mai Gonzalez is an 84 y.o. female here for a Medicare Wellness visit.     Past Medical, Surgical, and Family History reviewed and updated in chart.    Reviewed all medications by prescribing practitioner or clinical pharmacist (such as prescriptions, OTCs, herbal therapies and supplements) and documented in the medical record.    HPI  Here for [facility]  Patient Care Team:  Gabino Ornelas MD as PCP - General (Family Medicine)  Gabino Ornelas MD as PCP - O Medicare Advantage PCP     Review of Systems       denies chest pain SOB STEVENS diaphoresis nausea palpitations syncope presyncope orthopnea edema leg pain dysarthria aphasia focal weakness headache numbness tingling  visual disturbance gait disturbance polydipsia polyuria weight loss tremor epistaxis melena rectal bleeding fatigue weight change temperature intolerance.    6 minutes spent with with patient discussing depression screening.  PHQ 2 is negative    Objective   Vitals:  /76   Pulse 71   Temp 36.2 °C (97.1 °F)   Ht 1.651 m (5' 5\")   Wt 84.8 kg (187 lb)   SpO2 96%   BMI 31.12 kg/m²       Physical Exam    Appears comfortable  No retractions  Skin without pallor petechia icterus cyanosis  Neck without JVD thyromegaly bruits  Chest clear to auscultation without rales rhonchi wheeze  Heart regular rate and rhythm without murmur  Abdomen soft nondistended nontender without organomegaly or mass  Extremities without erythema edema Homans or cord  Peripheral pulses palpable  Neuro intact  Assessment & Plan  Type 2 diabetes mellitus without complication, without long-term current use of insulin    Orders:    Albumin-Creatinine Ratio, Urine Random; Future    Comprehensive Metabolic Panel; Future    Hemoglobin A1C; Future    Lipid Panel; Future    Tsh With Reflex To Free T4 If Abnormal; Future    Pulmonary emphysema, unspecified emphysema type (Multi)  Continue symbicort       Routine general medical examination at " health care facility    Orders:    1 Year Follow Up In Primary Care - Wellness Exam; Future    Need for vaccination    Orders:    zoster vaccine-recombinant adjuvanted (Shingrix) 50 mcg/0.5 mL vaccine; Inject 0.5 mL into the muscle 1 time for 1 dose.

## 2025-05-04 LAB
ALBUMIN SERPL-MCNC: 4.1 G/DL (ref 3.6–5.1)
ALBUMIN/CREAT UR: 29 MG/G CREAT
ALP SERPL-CCNC: 65 U/L (ref 37–153)
ALT SERPL-CCNC: 10 U/L (ref 6–29)
ANION GAP SERPL CALCULATED.4IONS-SCNC: 10 MMOL/L (CALC) (ref 7–17)
AST SERPL-CCNC: 11 U/L (ref 10–35)
BILIRUB SERPL-MCNC: 0.6 MG/DL (ref 0.2–1.2)
BUN SERPL-MCNC: 19 MG/DL (ref 7–25)
CALCIUM SERPL-MCNC: 9.5 MG/DL (ref 8.6–10.4)
CHLORIDE SERPL-SCNC: 108 MMOL/L (ref 98–110)
CHOLEST SERPL-MCNC: 116 MG/DL
CHOLEST/HDLC SERPL: 2.7 (CALC)
CO2 SERPL-SCNC: 24 MMOL/L (ref 20–32)
CREAT SERPL-MCNC: 0.96 MG/DL (ref 0.6–0.95)
CREAT UR-MCNC: 79 MG/DL (ref 20–275)
EGFRCR SERPLBLD CKD-EPI 2021: 58 ML/MIN/1.73M2
EST. AVERAGE GLUCOSE BLD GHB EST-MCNC: 134 MG/DL
EST. AVERAGE GLUCOSE BLD GHB EST-SCNC: 7.4 MMOL/L
GLUCOSE SERPL-MCNC: 120 MG/DL (ref 65–99)
HBA1C MFR BLD: 6.3 %
HDLC SERPL-MCNC: 43 MG/DL
LDLC SERPL CALC-MCNC: 53 MG/DL (CALC)
MICROALBUMIN UR-MCNC: 2.3 MG/DL
NONHDLC SERPL-MCNC: 73 MG/DL (CALC)
POTASSIUM SERPL-SCNC: 4.2 MMOL/L (ref 3.5–5.3)
PROT SERPL-MCNC: 6 G/DL (ref 6.1–8.1)
SODIUM SERPL-SCNC: 142 MMOL/L (ref 135–146)
TRIGL SERPL-MCNC: 116 MG/DL
TSH SERPL-ACNC: 2.4 MIU/L (ref 0.4–4.5)

## 2025-05-08 ENCOUNTER — TELEPHONE (OUTPATIENT)
Dept: PRIMARY CARE | Facility: CLINIC | Age: 85
End: 2025-05-08
Payer: MEDICARE

## 2025-05-08 NOTE — TELEPHONE ENCOUNTER
----- Message from Gabino Ornelas sent at 5/5/2025  9:59 AM EDT -----  Labs are at goal.  Follow-up in 6 months as scheduled  ----- Message -----  From: Eva Isbell Results In  Sent: 5/2/2025  10:07 PM EDT  To: Gabino Ornelas MD

## 2025-06-17 ENCOUNTER — TELEPHONE (OUTPATIENT)
Dept: PRIMARY CARE | Facility: CLINIC | Age: 85
End: 2025-06-17
Payer: MEDICARE

## 2025-06-17 DIAGNOSIS — I10 HYPERTENSION, UNSPECIFIED TYPE: ICD-10-CM

## 2025-06-17 RX ORDER — AMLODIPINE BESYLATE 5 MG/1
5 TABLET ORAL DAILY
Qty: 90 TABLET | Refills: 0 | Status: SHIPPED | OUTPATIENT
Start: 2025-06-17

## 2025-06-17 NOTE — TELEPHONE ENCOUNTER
Rx Refill Request Telephone Encounter    Name:  Mai Gonzalez  :  [id number]  Medication Name:  Amlodipine 5 mg tablet   Dose : 5 mg   Route : oral   Frequency : daily   Quantity : 90 tablet   Directions : Take 1 tablet (5 mg) by mouth once daily. For Hypertension  Specific Pharmacy location:  [facility]

## 2025-08-04 ENCOUNTER — TELEPHONE (OUTPATIENT)
Dept: PRIMARY CARE | Facility: CLINIC | Age: 85
End: 2025-08-04
Payer: MEDICARE

## 2025-08-04 DIAGNOSIS — E11.9 TYPE 2 DIABETES MELLITUS WITHOUT COMPLICATION, WITHOUT LONG-TERM CURRENT USE OF INSULIN: ICD-10-CM

## 2025-08-04 RX ORDER — METFORMIN HYDROCHLORIDE 500 MG/1
500 TABLET ORAL 2 TIMES DAILY
Qty: 180 TABLET | Refills: 1 | Status: SHIPPED | OUTPATIENT
Start: 2025-08-04 | End: 2026-01-31

## 2025-08-04 NOTE — TELEPHONE ENCOUNTER
Rx Refill Request Telephone Encounter    Name:  aMi Gonzalez  :  [id number]  Medication Name:  metformin  Dose : 500 mg  Directions : Take 1 tablet (500 mg) by mouth 2 times a day.  [facility]

## 2025-09-02 DIAGNOSIS — I10 HYPERTENSION, UNSPECIFIED TYPE: ICD-10-CM

## 2025-09-02 RX ORDER — ATENOLOL 50 MG/1
50 TABLET ORAL DAILY
Qty: 90 TABLET | Refills: 0 | Status: SHIPPED | OUTPATIENT
Start: 2025-09-02

## 2025-09-22 ENCOUNTER — APPOINTMENT (OUTPATIENT)
Dept: CARDIOLOGY | Facility: CLINIC | Age: 85
End: 2025-09-22
Payer: MEDICARE

## 2025-09-29 ENCOUNTER — APPOINTMENT (OUTPATIENT)
Dept: CARDIOLOGY | Facility: CLINIC | Age: 85
End: 2025-09-29
Payer: MEDICARE

## 2025-10-16 ENCOUNTER — APPOINTMENT (OUTPATIENT)
Dept: CARDIOLOGY | Facility: CLINIC | Age: 85
End: 2025-10-16
Payer: MEDICARE

## 2025-10-27 ENCOUNTER — APPOINTMENT (OUTPATIENT)
Dept: PRIMARY CARE | Facility: CLINIC | Age: 85
End: 2025-10-27
Payer: MEDICARE